# Patient Record
Sex: MALE | Race: WHITE | Employment: OTHER | URBAN - METROPOLITAN AREA
[De-identification: names, ages, dates, MRNs, and addresses within clinical notes are randomized per-mention and may not be internally consistent; named-entity substitution may affect disease eponyms.]

---

## 2017-01-05 ENCOUNTER — TELEPHONE (OUTPATIENT)
Dept: HEMATOLOGY/ONCOLOGY | Facility: HOSPITAL | Age: 72
End: 2017-01-05

## 2017-01-05 ENCOUNTER — OFFICE VISIT (OUTPATIENT)
Dept: SURGERY | Facility: CLINIC | Age: 72
End: 2017-01-05

## 2017-01-05 VITALS
BODY MASS INDEX: 28.63 KG/M2 | HEART RATE: 65 BPM | RESPIRATION RATE: 16 BRPM | SYSTOLIC BLOOD PRESSURE: 149 MMHG | DIASTOLIC BLOOD PRESSURE: 80 MMHG | WEIGHT: 200 LBS | TEMPERATURE: 98 F | HEIGHT: 70 IN

## 2017-01-05 DIAGNOSIS — D12.8 TUBULOVILLOUS ADENOMA OF RECTUM: ICD-10-CM

## 2017-01-05 DIAGNOSIS — K81.2 ACUTE ON CHRONIC CHOLECYSTITIS: Primary | ICD-10-CM

## 2017-01-05 DIAGNOSIS — M10.379 ACUTE GOUT DUE TO RENAL IMPAIRMENT INVOLVING ANKLE, UNSPECIFIED LATERALITY: Primary | ICD-10-CM

## 2017-01-05 DIAGNOSIS — C18.7 MALIGNANT NEOPLASM OF SIGMOID COLON (HCC): ICD-10-CM

## 2017-01-05 PROCEDURE — 99214 OFFICE O/P EST MOD 30 MIN: CPT | Performed by: COLON & RECTAL SURGERY

## 2017-01-05 RX ORDER — ALLOPURINOL 100 MG/1
100 TABLET ORAL DAILY
Qty: 30 TABLET | Refills: 11 | Status: SHIPPED | OUTPATIENT
Start: 2017-01-05 | End: 2017-12-30

## 2017-01-05 NOTE — PROGRESS NOTES
Follow Up Visit Note       Active Problems      1. Acute on chronic cholecystitis    2. Malignant neoplasm of sigmoid colon ([address]    3.  Tubulovillous adenoma of rectum          Chief Complaint   Gallbladder    History of Present Illness  This patient prese dose         Past Surgical History    OTHER SURGICAL HISTORY  2004    Comment left kidney removed    APPENDECTOMY      Comment in [address]  early 2015    COLONOSCOPY  04/21/15, 5/2016    Comment Dr. Tessa Oleary       The family h disturbance. Physical Findings   /80 mmHg  Pulse 65  Temp(Src) 97.7 °F (36.5 °C) (Oral)  Resp 16  Ht 70\"  Wt 200 lb  BMI 28.70 kg/m2  Physical Exam   Constitutional: He is oriented to person, place, and time.  He appears well-developed and wel superficial cervical and no deep cervical adenopathy present. Left cervical: No superficial cervical and no deep cervical adenopathy present. Right: No inguinal and no supraclavicular adenopathy present.         Left: No inguinal and no supracl abdomen other than trocar sites from his laparoscopic robotic low anterior resection of the rectosigmoid colon. Bowel sounds have normal activity and normal pitch.     This patient will undergo laparoscopic cholecystectomy with cholangiogram.    I have ask

## 2017-01-06 PROBLEM — K81.2 ACUTE ON CHRONIC CHOLECYSTITIS: Status: ACTIVE | Noted: 2017-01-06

## 2017-01-06 NOTE — PATIENT INSTRUCTIONS
This patient presents for symptoms of abdominal pain consistent with biliary colic. The patient was recently hospitalized in the emergency department with an acute attack of biliary colic.     He was noted on ultrasound from December 28, 2016 2 have chol 2017.    All risks, benefits, complications and alternatives to the proposed operation were fully discussed with the patient. All questions from the patient were answered in detail.  A description of the procedure and it's possible outcomes was fully discus

## 2017-01-12 ENCOUNTER — ANESTHESIA EVENT (OUTPATIENT)
Dept: SURGERY | Facility: HOSPITAL | Age: 72
End: 2017-01-12
Payer: MEDICARE

## 2017-01-13 ENCOUNTER — ANESTHESIA (OUTPATIENT)
Dept: SURGERY | Facility: HOSPITAL | Age: 72
End: 2017-01-13
Payer: MEDICARE

## 2017-01-13 ENCOUNTER — APPOINTMENT (OUTPATIENT)
Dept: GENERAL RADIOLOGY | Facility: HOSPITAL | Age: 72
End: 2017-01-13
Attending: COLON & RECTAL SURGERY
Payer: MEDICARE

## 2017-01-13 ENCOUNTER — SURGERY (OUTPATIENT)
Age: 72
End: 2017-01-13

## 2017-01-13 PROCEDURE — 74300 X-RAY BILE DUCTS/PANCREAS: CPT

## 2017-01-13 NOTE — ANESTHESIA PREPROCEDURE EVALUATION
PRE-OP EVALUATION    Patient Name: Lito Celis    Pre-op Diagnosis: Malignant neoplasm of sigmoid colon ([facility] [C18.7]    Procedure(s):  REMOVAL OF PORT-A-CATH & LAPAROSCOPIC CHOLECYSTECTOMY WITH CHOLANGIOGRAM          Surgeon(s) and Role:     Calli Pepper Use: No     Available pre-op labs reviewed.     Lab Results  Component Value Date   WBC 7.2 12/28/2016   RBC 4.32 12/28/2016   HGB 13.4 12/28/2016   HCT 38.3 12/28/2016   MCV 88.7 12/28/2016   MCH 31.0 12/28/2016   MCHC 35.0 12/28/2016   RDW 13.6 12/28/2016

## 2017-01-16 ENCOUNTER — TELEPHONE (OUTPATIENT)
Dept: SURGERY | Facility: CLINIC | Age: 72
End: 2017-01-16

## 2017-01-19 ENCOUNTER — OFFICE VISIT (OUTPATIENT)
Dept: SURGERY | Facility: CLINIC | Age: 72
End: 2017-01-19

## 2017-01-19 VITALS
TEMPERATURE: 98 F | WEIGHT: 192 LBS | HEART RATE: 69 BPM | RESPIRATION RATE: 16 BRPM | SYSTOLIC BLOOD PRESSURE: 143 MMHG | BODY MASS INDEX: 28 KG/M2 | DIASTOLIC BLOOD PRESSURE: 84 MMHG

## 2017-01-19 DIAGNOSIS — K80.20 CALCULUS OF GALLBLADDER WITHOUT CHOLECYSTITIS WITHOUT OBSTRUCTION: ICD-10-CM

## 2017-01-19 DIAGNOSIS — K81.2 ACUTE ON CHRONIC CHOLECYSTITIS: Primary | ICD-10-CM

## 2017-01-19 PROCEDURE — 99024 POSTOP FOLLOW-UP VISIT: CPT | Performed by: PHYSICIAN ASSISTANT

## 2017-01-23 NOTE — ANESTHESIA POSTPROCEDURE EVALUATION
Shyla 63 Patient Status:  Hospital Outpatient Surgery   Age/Gender 71year old male MRN [id number]   Location [address] [place] [address] Attending No att. providers found   Hosp Day # 0 PCP Miah Polo MD

## 2017-01-31 ENCOUNTER — TELEPHONE (OUTPATIENT)
Dept: HEMATOLOGY/ONCOLOGY | Facility: HOSPITAL | Age: 72
End: 2017-01-31

## 2017-01-31 RX ORDER — METHYLPREDNISOLONE 4 MG/1
TABLET ORAL
Qty: 1 PACKAGE | Refills: 0 | Status: SHIPPED | OUTPATIENT
Start: 2017-01-31 | End: 2017-06-23 | Stop reason: ALTCHOICE

## 2017-03-10 ENCOUNTER — APPOINTMENT (OUTPATIENT)
Dept: HEMATOLOGY/ONCOLOGY | Facility: HOSPITAL | Age: 72
End: 2017-03-10
Attending: INTERNAL MEDICINE
Payer: MEDICARE

## 2017-03-17 ENCOUNTER — NURSE ONLY (OUTPATIENT)
Dept: HEMATOLOGY/ONCOLOGY | Facility: HOSPITAL | Age: 72
End: 2017-03-17
Attending: INTERNAL MEDICINE
Payer: MEDICARE

## 2017-03-17 DIAGNOSIS — C18.7 MALIGNANT NEOPLASM OF SIGMOID COLON (HCC): Primary | ICD-10-CM

## 2017-03-17 LAB — CEA: 0.5 NG/ML (ref 0.5–5)

## 2017-03-17 PROCEDURE — 36415 COLL VENOUS BLD VENIPUNCTURE: CPT

## 2017-03-17 PROCEDURE — 82378 CARCINOEMBRYONIC ANTIGEN: CPT

## 2017-03-27 ENCOUNTER — OFFICE VISIT (OUTPATIENT)
Dept: SURGERY | Facility: CLINIC | Age: 72
End: 2017-03-27

## 2017-03-27 VITALS
HEIGHT: 70 IN | HEART RATE: 71 BPM | TEMPERATURE: 98 F | DIASTOLIC BLOOD PRESSURE: 80 MMHG | RESPIRATION RATE: 16 BRPM | WEIGHT: 192 LBS | SYSTOLIC BLOOD PRESSURE: 158 MMHG | BODY MASS INDEX: 27.49 KG/M2

## 2017-03-27 DIAGNOSIS — C18.7 MALIGNANT NEOPLASM OF SIGMOID COLON (HCC): Primary | ICD-10-CM

## 2017-03-27 DIAGNOSIS — Z90.5 HISTORY OF NEPHRECTOMY: ICD-10-CM

## 2017-03-27 DIAGNOSIS — N18.2 CHRONIC KIDNEY DISEASE (CKD), STAGE 2 (MILD): ICD-10-CM

## 2017-03-27 DIAGNOSIS — D12.8 TUBULOVILLOUS ADENOMA OF RECTUM: ICD-10-CM

## 2017-03-27 PROCEDURE — 99213 OFFICE O/P EST LOW 20 MIN: CPT | Performed by: COLON & RECTAL SURGERY

## 2017-03-28 PROBLEM — K81.2 ACUTE ON CHRONIC CHOLECYSTITIS: Status: RESOLVED | Noted: 2017-01-06 | Resolved: 2017-03-28

## 2017-03-28 NOTE — PROGRESS NOTES
Follow Up Visit Note       Active Problems      1. Malignant neoplasm of sigmoid colon ([address]    2. Tubulovillous adenoma of rectum    3. History of nephrectomy    4.  Chronic kidney disease (CKD), stage 2 (mild)          Chief Complaint   Patient presents w Social / Family History    The past medical and past surgical history have been reviewed by me today.     Past Medical History   Diagnosis Date   • High blood pressure      NO MEDS   • High cholesterol      NO MEDS   • Visual impairment      GLASSES   • Can swelling. Gastrointestinal: Negative for nausea, vomiting, abdominal pain, diarrhea, constipation, blood in stool, abdominal distention and anal bleeding. Genitourinary: Negative for dysuria, urgency, frequency and difficulty urinating.    Musculoskelet Clinical exam reveals the abdomen to be soft, nondistended, nontender, good bowel sounds. He has a well-healed right paramedian appendix incision. He has multiple laparoscopic incisions that are well-healed.   He has bowel sounds with normal active and Astler-Coller C2 lesion. He has completed his chemotherapy. The patient's port is subsequently been removed. The patient has no weight loss. He has no abdominal pain or complaints. He has no diarrhea or constipation.   He has no bright red blood pe

## 2017-03-28 NOTE — PATIENT INSTRUCTIONS
This patient presents for further care treatment regarding a previous rectal villous adenoma, and a malignant neoplasm of the sigmoid colon.     The patient's presenting history as listed below:    On May 6, 2015, he underwent transanal excision of a large months and possibly years. My next visit with this patient will be in 3 months for further care and treatment of his above listed problems and issues. No see this patient again in 3 months for further care and treatment. Jeronimo Mejia

## 2017-06-09 ENCOUNTER — APPOINTMENT (OUTPATIENT)
Dept: HEMATOLOGY/ONCOLOGY | Facility: HOSPITAL | Age: 72
End: 2017-06-09
Attending: INTERNAL MEDICINE
Payer: MEDICARE

## 2017-06-23 ENCOUNTER — OFFICE VISIT (OUTPATIENT)
Dept: HEMATOLOGY/ONCOLOGY | Facility: HOSPITAL | Age: 72
End: 2017-06-23
Attending: INTERNAL MEDICINE
Payer: MEDICARE

## 2017-06-23 VITALS
SYSTOLIC BLOOD PRESSURE: 160 MMHG | BODY MASS INDEX: 27.86 KG/M2 | HEIGHT: 70 IN | HEART RATE: 59 BPM | RESPIRATION RATE: 18 BRPM | DIASTOLIC BLOOD PRESSURE: 81 MMHG | TEMPERATURE: 99 F | OXYGEN SATURATION: 98 % | WEIGHT: 194.63 LBS

## 2017-06-23 DIAGNOSIS — C18.7 MALIGNANT NEOPLASM OF SIGMOID COLON (HCC): ICD-10-CM

## 2017-06-23 DIAGNOSIS — C18.9 MALIGNANT NEOPLASM OF COLON, UNSPECIFIED PART OF COLON (HCC): Primary | ICD-10-CM

## 2017-06-23 PROCEDURE — 99213 OFFICE O/P EST LOW 20 MIN: CPT | Performed by: INTERNAL MEDICINE

## 2017-06-23 NOTE — PROGRESS NOTES
Pt is here for MD f/u for colon cancer. Pt is here with daughter. Denies any complaints. Occasional generalized aches and pains. Appetite and energy level is good. Pt had a cholecystectomy in January.          Education Record    Learner:  Patient and Famil

## 2017-06-26 ENCOUNTER — OFFICE VISIT (OUTPATIENT)
Dept: SURGERY | Facility: CLINIC | Age: 72
End: 2017-06-26

## 2017-06-26 VITALS
HEART RATE: 62 BPM | TEMPERATURE: 98 F | SYSTOLIC BLOOD PRESSURE: 160 MMHG | HEIGHT: 70 IN | RESPIRATION RATE: 18 BRPM | WEIGHT: 198.19 LBS | BODY MASS INDEX: 28.37 KG/M2 | DIASTOLIC BLOOD PRESSURE: 77 MMHG

## 2017-06-26 DIAGNOSIS — N18.2 STAGE 2 CHRONIC KIDNEY DISEASE: ICD-10-CM

## 2017-06-26 DIAGNOSIS — T45.1X5A NEUROPATHY DUE TO CHEMOTHERAPEUTIC DRUG (HCC): ICD-10-CM

## 2017-06-26 DIAGNOSIS — G62.0 NEUROPATHY DUE TO CHEMOTHERAPEUTIC DRUG (HCC): ICD-10-CM

## 2017-06-26 DIAGNOSIS — Z90.5 HISTORY OF NEPHRECTOMY: ICD-10-CM

## 2017-06-26 DIAGNOSIS — C18.7 MALIGNANT NEOPLASM OF SIGMOID COLON (HCC): Primary | ICD-10-CM

## 2017-06-26 PROCEDURE — 99213 OFFICE O/P EST LOW 20 MIN: CPT | Performed by: COLON & RECTAL SURGERY

## 2017-06-26 NOTE — PROGRESS NOTES
[facility]    PATIENT'S NAME: Bruno Prince   ATTENDING PHYSICIAN: Margo Patiño M.D.    PATIENT ACCOUNT #: [de-identified] LOCATION: [address] [place] [address] RECORD #: [id number] YOB: 1945   DATE OF SERVICE: 06/23/2017       CANCER CE pounds. Blood pressure is 160/81, pulse 59, respiratory rate is 20, temperature is 99.1. HEENT:  Unremarkable. He has pink conjunctivae, anicteric sclerae. Pharynx without lesions.     LYMPHATICS:  He has no cervical, supraclavicular, or axillary adenop

## 2017-06-27 NOTE — PROGRESS NOTES
Follow Up Visit Note       Active Problems      1. Malignant neoplasm of sigmoid colon ([address]    2. History of nephrectomy    3. Stage 2 chronic kidney disease    4.  Neuropathy due to chemotherapeutic drug [facility])          Chief Complaint   Patient presents w / Family History    The past medical and past surgical history have been reviewed by me today.     Past Medical History:   Diagnosis Date   • Cancer ([facility] [address]     colon   • High blood pressure     NO MEDS   • High cholesterol     NO MEDS   • Personal history of swelling. Gastrointestinal: Negative for abdominal distention, abdominal pain, anal bleeding, blood in stool, constipation, diarrhea, nausea, rectal pain and vomiting. Genitourinary: Negative for difficulty urinating, dysuria, frequency and urgency. inguinal area and confirmed negative in the left inguinal area. Clinical exam reveals his abdomen to be soft, nondistended, nontender, good bowel sounds. He has essentially invisible incisions from his abdominal operation.   There are no inguinal, umbili resection the rectosigmoid colon for tumor at the rectosigmoid junction at the level of the sacral promontory. It was a moderately differentiated adenocarcinoma. He had T3 N1 M0 stage IIIB cancer. This is a Dukes Astler-Coller C2 lesion.      He has comp

## 2017-06-28 NOTE — PATIENT INSTRUCTIONS
This patient presents for further care treatment regarding a previous rectal villous adenoma, and a malignant neoplasm of the sigmoid colon.      The patient's presenting history as listed below:     On May 6, 2015, he underwent transanal excision of a larg residual disease from all of his above listed issues. I will see him again in September 2017.

## 2017-07-07 ENCOUNTER — TELEPHONE (OUTPATIENT)
Dept: SURGERY | Facility: CLINIC | Age: 72
End: 2017-07-07

## 2017-07-07 NOTE — TELEPHONE ENCOUNTER
Left message for daughter Sandra regarding her father Bridgett Fierro. Per Dr. Wyatt Upton patient needs to be seen for a 3 month follow up in 9/2017. Advised to call the office to make appointment.

## 2017-09-28 ENCOUNTER — APPOINTMENT (OUTPATIENT)
Dept: HEMATOLOGY/ONCOLOGY | Facility: HOSPITAL | Age: 72
End: 2017-09-28
Attending: INTERNAL MEDICINE
Payer: MEDICARE

## 2017-09-28 ENCOUNTER — OFFICE VISIT (OUTPATIENT)
Dept: SURGERY | Facility: CLINIC | Age: 72
End: 2017-09-28

## 2017-09-28 VITALS
SYSTOLIC BLOOD PRESSURE: 163 MMHG | RESPIRATION RATE: 12 BRPM | WEIGHT: 201 LBS | TEMPERATURE: 98 F | HEART RATE: 69 BPM | DIASTOLIC BLOOD PRESSURE: 88 MMHG | BODY MASS INDEX: 29 KG/M2

## 2017-09-28 DIAGNOSIS — Z90.5 HISTORY OF NEPHRECTOMY: ICD-10-CM

## 2017-09-28 DIAGNOSIS — C18.7 MALIGNANT NEOPLASM OF SIGMOID COLON (HCC): Primary | ICD-10-CM

## 2017-09-28 DIAGNOSIS — D12.8 TUBULOVILLOUS ADENOMA OF RECTUM: ICD-10-CM

## 2017-09-28 PROCEDURE — 99213 OFFICE O/P EST LOW 20 MIN: CPT | Performed by: COLON & RECTAL SURGERY

## 2017-09-28 NOTE — PROGRESS NOTES
Follow Up Visit Note       Active Problems      1. Malignant neoplasm of sigmoid colon ([address]    2. History of nephrectomy    3. Tubulovillous adenoma of rectum          Chief Complaint   Patient presents with:  Anal / Rectal Problem: 3 month colon f/u.  Pt medical and past surgical history have been reviewed by me today.     Past Medical History:   Diagnosis Date   • Cancer ([facility] [address]     colon   • High blood pressure     NO MEDS   • High cholesterol     NO MEDS   • Personal history of antineoplastic chemotherapy chest pain, palpitations and leg swelling. Gastrointestinal: Negative for abdominal distention, abdominal pain, anal bleeding, blood in stool, constipation, diarrhea, nausea and vomiting.    Genitourinary: Negative for difficulty urinating, dysuria, frequ left inguinal area. Clinical exam reveals his abdomen to be soft, nontender, nondistended, good bowel sounds. He has multiple well-healed laparoscopic incisions. He has a previous right lower quadrant appendix incision.   He has a left flank incisio robotic low anterior resection the rectosigmoid colon for tumor at the rectosigmoid junction at the level of the sacral promontory. It was a moderately differentiated adenocarcinoma. He had T3 N1 M0 stage IIIB cancer.   This is a Dukes Astler-Coller C2 le

## 2017-09-29 NOTE — PATIENT INSTRUCTIONS
This patient presents for further care treatment regarding a previous rectal villous adenoma, and a malignant neoplasm of the sigmoid colon.      The patient's presenting history as listed below:     On May 6, 2015, he underwent transanal excision of a larg clinical examination. I will see the patient again in 3 months for further care and treatment.

## 2017-11-08 ENCOUNTER — TELEPHONE (OUTPATIENT)
Dept: HEMATOLOGY/ONCOLOGY | Facility: HOSPITAL | Age: 72
End: 2017-11-08

## 2017-11-08 DIAGNOSIS — C18.7 MALIGNANT NEOPLASM OF SIGMOID COLON (HCC): Primary | ICD-10-CM

## 2017-12-08 ENCOUNTER — APPOINTMENT (OUTPATIENT)
Dept: HEMATOLOGY/ONCOLOGY | Facility: HOSPITAL | Age: 72
End: 2017-12-08
Attending: INTERNAL MEDICINE
Payer: MEDICARE

## 2017-12-18 ENCOUNTER — HOSPITAL ENCOUNTER (OUTPATIENT)
Dept: CT IMAGING | Facility: HOSPITAL | Age: 72
Discharge: HOME | End: 2017-12-18
Attending: INTERNAL MEDICINE
Payer: MEDICARE

## 2017-12-18 DIAGNOSIS — C18.7 MALIGNANT NEOPLASM OF SIGMOID COLON (HCC): ICD-10-CM

## 2017-12-18 PROCEDURE — 71250 CT THORAX DX C-: CPT | Performed by: INTERNAL MEDICINE

## 2017-12-18 PROCEDURE — 74176 CT ABD & PELVIS W/O CONTRAST: CPT | Performed by: INTERNAL MEDICINE

## 2017-12-26 ENCOUNTER — APPOINTMENT (OUTPATIENT)
Dept: HEMATOLOGY/ONCOLOGY | Facility: HOSPITAL | Age: 72
End: 2017-12-26
Attending: INTERNAL MEDICINE
Payer: MEDICARE

## 2017-12-29 ENCOUNTER — OFFICE VISIT (OUTPATIENT)
Dept: HEMATOLOGY/ONCOLOGY | Facility: HOSPITAL | Age: 72
End: 2017-12-29
Attending: INTERNAL MEDICINE
Payer: MEDICARE

## 2017-12-29 VITALS
TEMPERATURE: 99 F | OXYGEN SATURATION: 93 % | RESPIRATION RATE: 18 BRPM | DIASTOLIC BLOOD PRESSURE: 81 MMHG | WEIGHT: 202.38 LBS | HEART RATE: 76 BPM | SYSTOLIC BLOOD PRESSURE: 185 MMHG | HEIGHT: 70 IN | BODY MASS INDEX: 28.97 KG/M2

## 2017-12-29 DIAGNOSIS — C18.9 MALIGNANT NEOPLASM OF COLON, UNSPECIFIED PART OF COLON (HCC): ICD-10-CM

## 2017-12-29 LAB — CEA: 0.5 NG/ML (ref 0.5–5)

## 2017-12-29 PROCEDURE — 99213 OFFICE O/P EST LOW 20 MIN: CPT | Performed by: INTERNAL MEDICINE

## 2017-12-29 NOTE — PROGRESS NOTES
Patient is here for MD f/u for colon cancer. Pt had a ct scan on 12/19. Feeling well. Appetite and energy level is good. No complaints. Here with family.         Education Record    Learner:  Patient and Family Member    Disease / Diagnosis:  Colon cancer

## 2017-12-30 DIAGNOSIS — M10.379 ACUTE GOUT DUE TO RENAL IMPAIRMENT INVOLVING ANKLE, UNSPECIFIED LATERALITY: ICD-10-CM

## 2017-12-30 DIAGNOSIS — C18.7 MALIGNANT NEOPLASM OF SIGMOID COLON (HCC): ICD-10-CM

## 2017-12-31 RX ORDER — ALLOPURINOL 100 MG/1
TABLET ORAL
Qty: 30 TABLET | Refills: 0 | Status: SHIPPED | OUTPATIENT
Start: 2017-12-31 | End: 2018-02-03

## 2018-01-01 NOTE — PROGRESS NOTES
[facility]    PATIENT'S NAME: Gia Faustin   ATTENDING PHYSICIAN: Ronny Varela M.D.    PATIENT ACCOUNT #: [de-identified] LOCATION: [address] [place] [address] RECORD #: [id number] YOB: 1945   DATE OF SERVICE: 12/29/2017       CANCER CE or gallop. ABDOMEN:  No hepatosplenomegaly or tenderness. EXTREMITIES:  He has no clubbing, cyanosis, or edema. NEUROLOGIC:  He is intact. LABORATORY DATA:  CEA was drawn today, it is 0.5.     IMPRESSION:  The patient is well with no evidence of re

## 2018-01-11 ENCOUNTER — OFFICE VISIT (OUTPATIENT)
Dept: SURGERY | Facility: CLINIC | Age: 73
End: 2018-01-11

## 2018-01-11 VITALS
DIASTOLIC BLOOD PRESSURE: 95 MMHG | TEMPERATURE: 98 F | BODY MASS INDEX: 29.33 KG/M2 | HEIGHT: 69 IN | HEART RATE: 77 BPM | SYSTOLIC BLOOD PRESSURE: 168 MMHG | WEIGHT: 198 LBS

## 2018-01-11 DIAGNOSIS — Z90.5 HISTORY OF NEPHRECTOMY: ICD-10-CM

## 2018-01-11 DIAGNOSIS — C18.7 MALIGNANT NEOPLASM OF SIGMOID COLON (HCC): Primary | ICD-10-CM

## 2018-01-11 DIAGNOSIS — D12.8 TUBULOVILLOUS ADENOMA OF RECTUM: ICD-10-CM

## 2018-01-11 PROCEDURE — 99213 OFFICE O/P EST LOW 20 MIN: CPT | Performed by: COLON & RECTAL SURGERY

## 2018-01-11 NOTE — PROGRESS NOTES
Follow Up Visit Note       Active Problems      1. Malignant neoplasm of sigmoid colon ([address]    2. History of nephrectomy    3.  Tubulovillous adenoma of rectum          Chief Complaint   Rectosigmoid cancer, villous tumor of the rectum      History of Pres with this patient was 30 minutes. Greater than half of our visit was spent in counseling the patient on the above listed diagnoses and treatment options.         PROCEDURE:  CT CHEST+ABDOMEN+PELVIS(CPT=71250/26633)     COMPARISON:  [facility] , CT CHEST+ABDOME aneurysm. RETROPERITONEUM:  No adenopathy. BOWEL/MESENTERY:  Normal bowel caliber. No new colonic inflammation. No ascites. Distal sigmoid stable line. ABDOMINAL WALL:  Tiny fat containing inguinal hernias.   URINARY BLADDER:  Normal moderately diste Packs/day: 0.50      Years: 35.00          Quit date: 12/28/1995    Smokeless tobacco: Never Used                        Alcohol use: Yes                Comment: 1-2 a weekj    Drug use:  No                 Current Ou Normal rate, regular rhythm, S1 normal, S2 normal and normal heart sounds. No murmur heard. Pulmonary/Chest: Effort normal and breath sounds normal. No accessory muscle usage. No tachypnea. No respiratory distress. He has no decreased breath sounds.  He diaphoretic. Nursing note and vitals reviewed.            Assessment   Malignant neoplasm of sigmoid colon ([facility] [address]  (primary encounter diagnosis)  History of nephrectomy  Tubulovillous adenoma of rectum    Plan     I am seeing this patient for further care bowel sounds. He has a right lower quadrant appendix incision. He has no visible incisions from his laparoscopic low anterior resection of the rectosigmoid colon. There is no evidence of ascites. Liver is normal, spleen is not palpable.   He has no curr

## 2018-01-11 NOTE — PATIENT INSTRUCTIONS
I am seeing this patient for further care and treatment of his sigmoid colon cancer. This patient had a previous rectal villous adenoma, and a malignant neoplasm of the sigmoid colon.       On May 6, 2015, he underwent transanal excision of a large anal spleen is not palpable. He has no current hernias. He has no abdominal wall masses, or masses within the abdominal cavity. Bowel sounds have normal activity normal pitch. There is no lymphadenopathy in the inguinal regions.     This patient demonstrates

## 2018-02-03 DIAGNOSIS — M10.379 ACUTE GOUT DUE TO RENAL IMPAIRMENT INVOLVING ANKLE, UNSPECIFIED LATERALITY: ICD-10-CM

## 2018-02-03 DIAGNOSIS — C18.7 MALIGNANT NEOPLASM OF SIGMOID COLON (HCC): ICD-10-CM

## 2018-02-03 RX ORDER — ALLOPURINOL 100 MG/1
TABLET ORAL
Qty: 30 TABLET | Refills: 0 | Status: SHIPPED | OUTPATIENT
Start: 2018-02-03 | End: 2018-02-21

## 2018-02-21 DIAGNOSIS — C18.7 MALIGNANT NEOPLASM OF SIGMOID COLON (HCC): ICD-10-CM

## 2018-02-21 DIAGNOSIS — M10.379 ACUTE GOUT DUE TO RENAL IMPAIRMENT INVOLVING ANKLE, UNSPECIFIED LATERALITY: ICD-10-CM

## 2018-02-21 RX ORDER — ALLOPURINOL 100 MG/1
TABLET ORAL
Qty: 30 TABLET | Refills: 0 | Status: SHIPPED | OUTPATIENT
Start: 2018-02-21 | End: 2018-03-28

## 2018-03-22 ENCOUNTER — NURSE ONLY (OUTPATIENT)
Dept: HEMATOLOGY/ONCOLOGY | Facility: HOSPITAL | Age: 73
End: 2018-03-22
Attending: INTERNAL MEDICINE
Payer: MEDICARE

## 2018-03-22 DIAGNOSIS — C18.9 MALIGNANT NEOPLASM OF COLON, UNSPECIFIED PART OF COLON (HCC): ICD-10-CM

## 2018-03-22 LAB — CEA: 0.6 NG/ML (ref 0.5–5)

## 2018-03-22 PROCEDURE — 36415 COLL VENOUS BLD VENIPUNCTURE: CPT

## 2018-03-22 PROCEDURE — 82378 CARCINOEMBRYONIC ANTIGEN: CPT

## 2018-03-26 ENCOUNTER — APPOINTMENT (OUTPATIENT)
Dept: HEMATOLOGY/ONCOLOGY | Facility: HOSPITAL | Age: 73
End: 2018-03-26
Attending: INTERNAL MEDICINE
Payer: MEDICARE

## 2018-03-28 DIAGNOSIS — M10.379 ACUTE GOUT DUE TO RENAL IMPAIRMENT INVOLVING ANKLE, UNSPECIFIED LATERALITY: ICD-10-CM

## 2018-03-28 DIAGNOSIS — C18.7 MALIGNANT NEOPLASM OF SIGMOID COLON (HCC): ICD-10-CM

## 2018-03-28 RX ORDER — ALLOPURINOL 100 MG/1
TABLET ORAL
Qty: 30 TABLET | Refills: 0 | Status: SHIPPED | OUTPATIENT
Start: 2018-03-28 | End: 2018-07-24

## 2018-04-02 ENCOUNTER — APPOINTMENT (OUTPATIENT)
Dept: HEMATOLOGY/ONCOLOGY | Facility: HOSPITAL | Age: 73
End: 2018-04-02
Attending: INTERNAL MEDICINE
Payer: MEDICARE

## 2018-06-11 ENCOUNTER — TELEPHONE (OUTPATIENT)
Dept: HEMATOLOGY/ONCOLOGY | Facility: HOSPITAL | Age: 73
End: 2018-06-11

## 2018-06-11 RX ORDER — ALLOPURINOL 100 MG/1
100 TABLET ORAL DAILY
Qty: 30 TABLET | Refills: 11 | Status: SHIPPED | OUTPATIENT
Start: 2018-06-11 | End: 2019-07-11

## 2018-07-10 ENCOUNTER — APPOINTMENT (OUTPATIENT)
Dept: HEMATOLOGY/ONCOLOGY | Facility: HOSPITAL | Age: 73
End: 2018-07-10
Attending: INTERNAL MEDICINE
Payer: MEDICARE

## 2018-07-12 ENCOUNTER — OFFICE VISIT (OUTPATIENT)
Dept: SURGERY | Facility: CLINIC | Age: 73
End: 2018-07-12

## 2018-07-12 VITALS
WEIGHT: 198 LBS | HEIGHT: 69 IN | HEART RATE: 68 BPM | DIASTOLIC BLOOD PRESSURE: 83 MMHG | BODY MASS INDEX: 29.33 KG/M2 | SYSTOLIC BLOOD PRESSURE: 165 MMHG

## 2018-07-12 DIAGNOSIS — Z90.5 HISTORY OF NEPHRECTOMY: ICD-10-CM

## 2018-07-12 DIAGNOSIS — C18.7 MALIGNANT NEOPLASM OF SIGMOID COLON (HCC): Primary | ICD-10-CM

## 2018-07-12 DIAGNOSIS — D12.8 TUBULOVILLOUS ADENOMA OF RECTUM: ICD-10-CM

## 2018-07-12 PROCEDURE — 99213 OFFICE O/P EST LOW 20 MIN: CPT | Performed by: COLON & RECTAL SURGERY

## 2018-07-12 NOTE — PROGRESS NOTES
Follow Up Visit Note       Active Problems      1. Malignant neoplasm of sigmoid colon ([address]    2. Tubulovillous adenoma of rectum    3.  History of nephrectomy          Chief Complaint   Patient presents with:  Colon Problem: 6 month follow up for further 10/27/2016, 8:32. INDICATIONS:  C18.7 Malignant neoplasm of sigmoid colon     TECHNIQUE:  Following oral contrast administration, unenhanced multislice CT scanning is performed through the chest, abdomen, and pelvis.   Dose reduction techniques were use enlarged. PELVIC ORGANS:  Moderate prostatomegaly. BONES:  Moderate to severe disk degenerative changes L4-L5 appearing similar to the prior. Mild to moderate degenerative changes elsewhere.     =====  CONCLUSION:    1.   No findings of recurrent neoplas Prescriptions:  allopurinol 100 MG Oral Tab Take 1 tablet (100 mg total) by mouth daily. Disp: 30 tablet Rfl: 11   aspirin 81 MG Oral Tab Take 81 mg by mouth daily.  Disp:  Rfl:         Review of Systems  The Review of Systems has been reviewed by me during has no rales. He exhibits no mass. Abdominal: Soft. Normal appearance, normal aorta and bowel sounds are normal. He exhibits no distension, no fluid wave, no ascites, no pulsatile midline mass and no mass. There is no splenomegaly or hepatomegaly.  There history as listed below:     On May 6, 2015, he underwent transanal excision of a large anal canal and rectal polyp. This was noted to have focal high-grade dysplasia. There was no invasive tumor at the complete removal of this lesion.   There are good ma

## 2018-07-24 ENCOUNTER — OFFICE VISIT (OUTPATIENT)
Dept: HEMATOLOGY/ONCOLOGY | Facility: HOSPITAL | Age: 73
End: 2018-07-24
Attending: INTERNAL MEDICINE
Payer: MEDICARE

## 2018-07-24 VITALS
BODY MASS INDEX: 27.75 KG/M2 | HEIGHT: 70 IN | HEART RATE: 56 BPM | SYSTOLIC BLOOD PRESSURE: 174 MMHG | TEMPERATURE: 99 F | RESPIRATION RATE: 18 BRPM | DIASTOLIC BLOOD PRESSURE: 76 MMHG | OXYGEN SATURATION: 97 % | WEIGHT: 193.81 LBS

## 2018-07-24 DIAGNOSIS — C18.9 MALIGNANT NEOPLASM OF COLON, UNSPECIFIED PART OF COLON (HCC): ICD-10-CM

## 2018-07-24 LAB — CEA SERPL-MCNC: 0.5 NG/ML (ref 0.5–5)

## 2018-07-24 PROCEDURE — 99213 OFFICE O/P EST LOW 20 MIN: CPT | Performed by: INTERNAL MEDICINE

## 2018-07-24 NOTE — PROGRESS NOTES
Patient is here for MD f/u for colon cancer. Last ct scan was in December. Pt denies any GI complaints. Appetite and energy level is good.          Education Record    Learner:  Patient and Family Member    Disease / Diagnosis:  Colon cancer     Barriers /

## 2018-07-26 NOTE — PATIENT INSTRUCTIONS
This patient presents for further care treatment regarding a previous rectal villous adenoma, and a malignant neoplasm of the sigmoid colon.      The patient's presenting history as listed below:     On May 6, 2015, he underwent transanal excision of a larg

## 2018-07-31 NOTE — PROGRESS NOTES
[facility]    PATIENT'S NAME: Grace Coyne   ATTENDING PHYSICIAN: Sarah Taylor M.D.    PATIENT ACCOUNT #: [de-identified] LOCATION: [address] [place] [address] RECORD #: [id number] YOB: 1945   DATE OF SERVICE: 07/24/2018       CANCER CE or edema. NEUROLOGIC:  He is intact. LABORATORY DATA:  CEA was drawn and it is 0.5. IMPRESSION:  Colon cancer:  He is over 3 years from surgery and 3 years disease free.   We will do another CT scan in December and we will probably stop doing them af

## 2018-12-17 ENCOUNTER — HOSPITAL ENCOUNTER (OUTPATIENT)
Dept: CT IMAGING | Facility: HOSPITAL | Age: 73
Discharge: HOME | End: 2018-12-17
Attending: INTERNAL MEDICINE
Payer: MEDICARE

## 2018-12-17 ENCOUNTER — TELEPHONE (OUTPATIENT)
Dept: HEMATOLOGY/ONCOLOGY | Facility: HOSPITAL | Age: 73
End: 2018-12-17

## 2018-12-17 DIAGNOSIS — C18.9 MALIGNANT NEOPLASM OF COLON, UNSPECIFIED PART OF COLON (HCC): ICD-10-CM

## 2018-12-17 PROCEDURE — 74176 CT ABD & PELVIS W/O CONTRAST: CPT | Performed by: INTERNAL MEDICINE

## 2018-12-17 PROCEDURE — 82565 ASSAY OF CREATININE: CPT

## 2018-12-17 PROCEDURE — 71250 CT THORAX DX C-: CPT | Performed by: INTERNAL MEDICINE

## 2018-12-17 NOTE — TELEPHONE ENCOUNTER
Pt at CT scan now. Stat creat 2.5, pt already drank PO contrast for CAP. Discussed with ALEXANDRA Culver. NO IV contrast. CT tech aware.

## 2019-01-10 ENCOUNTER — OFFICE VISIT (OUTPATIENT)
Dept: SURGERY | Facility: CLINIC | Age: 74
End: 2019-01-10
Payer: MEDICARE

## 2019-01-10 VITALS
TEMPERATURE: 98 F | HEART RATE: 78 BPM | DIASTOLIC BLOOD PRESSURE: 77 MMHG | WEIGHT: 193 LBS | BODY MASS INDEX: 28 KG/M2 | SYSTOLIC BLOOD PRESSURE: 166 MMHG

## 2019-01-10 DIAGNOSIS — C18.7 MALIGNANT NEOPLASM OF SIGMOID COLON (HCC): Primary | ICD-10-CM

## 2019-01-10 DIAGNOSIS — D12.8 TUBULOVILLOUS ADENOMA OF RECTUM: ICD-10-CM

## 2019-01-10 PROCEDURE — 99213 OFFICE O/P EST LOW 20 MIN: CPT | Performed by: COLON & RECTAL SURGERY

## 2019-01-10 NOTE — PROGRESS NOTES
Follow Up Visit Note       Active Problems      1. Malignant neoplasm of sigmoid colon ([address]    2.  Tubulovillous adenoma of rectum          Chief Complaint   Patient presents with:  Colon Cancer: cont care colon cancer, pt denies concerns, seeing Dr. Macy Brown was 30 minutes. Greater than half of our visit was spent in counseling the patient on the above listed diagnoses and treatment options.         PROCEDURE:  CT CHEST+ABDOMEN+PELVIS(CPT=71250/48364)     COMPARISON:  [facility] , CT CHEST+ABDOMEN+PELVIS(JVI=33685 bowel wall thickening. ABDOMINAL WALL:  Stable tiny bilateral fat containing inguinal hernias. URINARY BLADDER:  Incompletely distended. No visible focal wall thickening, lesion, or calculus. PELVIC NODES:  No adenopathy.     PELVIC ORGANS:  Stable en       Spouse name: Not on file      Number of children: Not on file      Years of education: Not on file      Highest education level: Not on file    Tobacco Use      Smoking status: Former Smoker        Packs/day: 0.50        Years: 35.00        P atraumatic. Eyes: Conjunctivae are normal. No scleral icterus. Neck: Trachea normal. No JVD present. Carotid bruit is not present. No tracheal deviation present. No thyroid mass and no thyromegaly present.    Cardiovascular: Normal rate, regular rhythm, reviewed.            Assessment   Malignant neoplasm of sigmoid colon ([facility] [address]  (primary encounter diagnosis)  Tubulovillous adenoma of rectum    Plan   This patient presents for further care treatment regarding a previous rectal villous adenoma, and a maligna and spleen are not palpable. There are no inguinal, umbilical, or incisional hernias present. All incisions have faded. Bowel sounds have normal activity and normal pitch.     This patient remains without any evidence of recurrent or residual disease fro

## 2019-01-11 ENCOUNTER — OFFICE VISIT (OUTPATIENT)
Dept: HEMATOLOGY/ONCOLOGY | Facility: HOSPITAL | Age: 74
End: 2019-01-11
Attending: INTERNAL MEDICINE
Payer: MEDICARE

## 2019-01-11 VITALS
SYSTOLIC BLOOD PRESSURE: 167 MMHG | OXYGEN SATURATION: 97 % | HEIGHT: 70 IN | WEIGHT: 198.81 LBS | BODY MASS INDEX: 28.46 KG/M2 | DIASTOLIC BLOOD PRESSURE: 81 MMHG | HEART RATE: 63 BPM | TEMPERATURE: 98 F

## 2019-01-11 DIAGNOSIS — C18.9 MALIGNANT NEOPLASM OF COLON, UNSPECIFIED PART OF COLON (HCC): ICD-10-CM

## 2019-01-11 LAB — CEA SERPL-MCNC: 0.5 NG/ML (ref 0.5–5)

## 2019-01-11 PROCEDURE — 99213 OFFICE O/P EST LOW 20 MIN: CPT | Performed by: INTERNAL MEDICINE

## 2019-01-11 NOTE — PROGRESS NOTES
Patient is here for MD f/u for colon cancer . Last Ct scan was on 12/17. Feeling well. No complaints.         Education Record    Learner:  Patient    Disease / Diagnosis:  Colon cancer     Barriers / Limitations:  None   Comments:    Method:  Discussion

## 2019-01-11 NOTE — PATIENT INSTRUCTIONS
This patient presents for further care treatment regarding a previous rectal villous adenoma, and a malignant neoplasm of the sigmoid colon.      The patient's presenting history as listed below:     On May 6, 2015, he underwent transanal excision of a larg activity and normal pitch. This patient remains without any evidence of recurrent or residual disease from his Collier Astler-Coller C2 lesion resected on May 27, 2015. I will see this patient again in 6 months for further care and treatment.

## 2019-01-14 NOTE — PROGRESS NOTES
[facility]    PATIENT'S NAME: Silas Thompson   ATTENDING PHYSICIAN: Lorraine Caceres M.D.    PATIENT ACCOUNT #: [de-identified] LOCATION: [address] [place] [address] RECORD #: [id number] YOB: 1945   DATE OF SERVICE: 01/11/2019       CANCER CE clear to auscultation, with no wheezing, rales, or rhonchi. HEART:  Normal.  ABDOMEN:  No hepatosplenomegaly or tenderness. EXTREMITIES:  He has no clubbing, cyanosis, or edema. NEUROLOGIC:  Intact. LABORATORY DATA:  His CEA was 0.5.     IMPRESSION:

## 2019-06-14 PROBLEM — D12.2 BENIGN NEOPLASM OF ASCENDING COLON: Status: ACTIVE | Noted: 2019-06-14

## 2019-06-14 PROBLEM — Z85.038 PERSONAL HISTORY OF OTHER MALIGNANT NEOPLASM OF LARGE INTESTINE: Status: ACTIVE | Noted: 2019-06-14

## 2019-07-05 ENCOUNTER — OFFICE VISIT (OUTPATIENT)
Dept: HEMATOLOGY/ONCOLOGY | Facility: HOSPITAL | Age: 74
End: 2019-07-05
Attending: INTERNAL MEDICINE
Payer: MEDICARE

## 2019-07-05 VITALS
OXYGEN SATURATION: 97 % | TEMPERATURE: 98 F | HEART RATE: 60 BPM | DIASTOLIC BLOOD PRESSURE: 78 MMHG | BODY MASS INDEX: 30.1 KG/M2 | SYSTOLIC BLOOD PRESSURE: 190 MMHG | RESPIRATION RATE: 20 BRPM | HEIGHT: 67.99 IN | WEIGHT: 198.63 LBS

## 2019-07-05 DIAGNOSIS — C18.9 MALIGNANT NEOPLASM OF COLON, UNSPECIFIED PART OF COLON (HCC): ICD-10-CM

## 2019-07-05 LAB — CEA SERPL-MCNC: 0.5 NG/ML (ref ?–5)

## 2019-07-05 PROCEDURE — 99213 OFFICE O/P EST LOW 20 MIN: CPT | Performed by: INTERNAL MEDICINE

## 2019-07-05 NOTE — PROGRESS NOTES
MD f/u for colon ca. Reports that he is healing well. Denies any issues or complaints. Colonoscopy completed 2 weeks ago.      Education Record    Learner:  Patient, family     Barriers / Limitations:  None   Comments:    Method:  Discussion   Comments:

## 2019-07-07 NOTE — PROGRESS NOTES
[facility]    PATIENT'S NAME: Johnsay Og   ATTENDING PHYSICIAN: Barrera Ramsey M.D.    PATIENT ACCOUNT #: [de-identified] LOCATION: [address] [place] [address] RECORD #: [id number] YOB: 1945   DATE OF SERVICE: 07/05/2019       CANCER CE Regular S1 and S2 with no murmur or gallop. ABDOMEN:  No hepatosplenomegaly or tenderness. EXTREMITIES:  He has no clubbing, cyanosis, or edema. NEUROLOGIC:  He is intact. LABORATORY DATA:  CEA was drawn today, it is 0.5.     IMPRESSION:  History o

## 2019-07-11 ENCOUNTER — OFFICE VISIT (OUTPATIENT)
Dept: SURGERY | Facility: CLINIC | Age: 74
End: 2019-07-11

## 2019-07-11 VITALS
SYSTOLIC BLOOD PRESSURE: 179 MMHG | HEART RATE: 60 BPM | DIASTOLIC BLOOD PRESSURE: 84 MMHG | TEMPERATURE: 98 F | BODY MASS INDEX: 31.08 KG/M2 | HEIGHT: 67 IN | WEIGHT: 198 LBS

## 2019-07-11 DIAGNOSIS — D12.8 TUBULOVILLOUS ADENOMA OF RECTUM: ICD-10-CM

## 2019-07-11 DIAGNOSIS — C18.7 MALIGNANT NEOPLASM OF SIGMOID COLON (HCC): Primary | ICD-10-CM

## 2019-07-11 PROCEDURE — 99214 OFFICE O/P EST MOD 30 MIN: CPT | Performed by: COLON & RECTAL SURGERY

## 2019-07-11 RX ORDER — ALLOPURINOL 100 MG/1
TABLET ORAL
Qty: 30 TABLET | Refills: 11 | Status: SHIPPED | OUTPATIENT
Start: 2019-07-11 | End: 2020-07-23

## 2019-07-11 NOTE — PATIENT INSTRUCTIONS
Jayden Parker is a 69year old male here for continued care and treatment following excision of a moderately differentiated adenocarcinoma of the rectosigmoid colon in 2015. The patient denies any new complaints.   He denies any abdominal pain, nause guarding. There are no signs of ascites or peritonitis. Liver and spleen are nonpalpable. There are no palpable masses. There are no inguinal, umbilical, or incisional hernias present. All incisions have faded.      This patient remains without any juan

## 2019-07-11 NOTE — PROGRESS NOTES
Follow Up Visit Note       Active Problems      1. Malignant neoplasm of sigmoid colon ([address]    2.  Tubulovillous adenoma of rectum          Chief Complaint   Patient presents with:  Establish Care: Colon cancer continued care -colonoscopy on 6/14/19 - [facility] IIIB cancer. This is a Dukes Astler-Coller C2 lesion. He has completed his chemotherapy. The patient's port has subsequently been removed. I acted as a scribe in this encounter.      The physician obtained a history, independently performed a physi or axillary adenopathy. AORTA:  Mild atherosclerosis. No aneurysm or dissection.     VASCULATURE:  Pulmonary emboli cannot be detected without IV contrast.     ABDOMEN/PELVIS:  LIVER:  No enlargement, atrophy, abnormal density, or significant focal lesi Performed by Camilo Lockett MD at [facility] MAIN OR   • CHOLECYSTECTOMY  1/2017   • COLECTOMY  early 2015   • COLONOSCOPY  04/21/15, 5/2016    Dr. Damian Niño   • EXCISION ANAL MASS/POLYP N/A 5/6/2015    Performed by Camilo Lockett MD at [address] [place] [address] blood in stool, constipation, diarrhea, nausea and vomiting. Genitourinary: Negative for difficulty urinating, dysuria, frequency and urgency. Musculoskeletal: Negative for arthralgias and myalgias. Skin: Negative for color change and rash.    Neurolo normal pitch. There is no rebounding tenderness or guarding. There are no signs of ascites or peritonitis. Liver and spleen are nonpalpable. There are no palpable masses. There are no inguinal, umbilical, or incisional hernias present.   All incisions transanal excision of a large anal canal and rectal polyp. This was noted to have focal high-grade dysplasia. There was no invasive tumor at the complete removal of this lesion. There are good margins.   No further treatment was done at the level of the

## 2020-01-21 ENCOUNTER — OFFICE VISIT (OUTPATIENT)
Dept: SURGERY | Facility: CLINIC | Age: 75
End: 2020-01-21
Payer: MEDICARE

## 2020-01-21 VITALS
SYSTOLIC BLOOD PRESSURE: 144 MMHG | HEART RATE: 60 BPM | WEIGHT: 198 LBS | BODY MASS INDEX: 31 KG/M2 | DIASTOLIC BLOOD PRESSURE: 82 MMHG | TEMPERATURE: 98 F

## 2020-01-21 DIAGNOSIS — C18.7 MALIGNANT NEOPLASM OF SIGMOID COLON (HCC): Primary | ICD-10-CM

## 2020-01-21 DIAGNOSIS — D12.8 TUBULOVILLOUS ADENOMA OF RECTUM: ICD-10-CM

## 2020-01-21 DIAGNOSIS — Z90.5 HISTORY OF NEPHRECTOMY: ICD-10-CM

## 2020-01-21 PROCEDURE — 99213 OFFICE O/P EST LOW 20 MIN: CPT | Performed by: COLON & RECTAL SURGERY

## 2020-01-21 NOTE — PROGRESS NOTES
Follow Up Visit Note       Active Problems      1. Malignant neoplasm of sigmoid colon ([address]    2. Tubulovillous adenoma of rectum    3.  History of nephrectomy          Chief Complaint   Patient presents with:  Continuity Care: 6 month cont care for colon 17, 2018. This was completely normal with no evidence of recurrent or residual disease. He is tolerating a diet without complications. He has no nausea or vomiting. He has no fever or chills. He has no diarrhea or constipation.     He sees no bright Spouse name: Not on file      Number of children: Not on file      Years of education: Not on file      Highest education level: Not on file    Tobacco Use      Smoking status: Former Smoker        Packs/day: 0.50        Years: 35.00        Pack years: 16 Eyes: Conjunctivae are normal. No scleral icterus. Neck: Trachea normal. No JVD present. Carotid bruit is not present. No tracheal deviation present. No thyroid mass and no thyromegaly present.    Cardiovascular: Normal rate, regular rhythm, S1 normal, deep cervical adenopathy present. Right: No inguinal and no supraclavicular adenopathy present. Left: No inguinal and no supraclavicular adenopathy present. Neurological: He is alert and oriented to person, place, and time.    Skin: He is no by Dr. Noe Christopher. The patient sees Dr. Mickey Charles for oncology. His last CT scan of the chest, abdomen, and pelvis, was performed on December 17, 2018. This was completely normal with no evidence of recurrent or residual disease.     He is tolerating a diet

## 2020-01-21 NOTE — PATIENT INSTRUCTIONS
This patient has been treated in the past for multiple neoplasms of the colon and rectum. The patient was found to have synchronous lesions. He presents for further care and treatment after successful resection of both lesions.   This patient has a histor narrowing of the stool. Clinical exam reveals the left flank incision to be in line with where he points to as his point of maximal symptoms of this occasional stabbing pain.   The left ribs, left upper quadrant, and incision site show no complications,

## 2020-06-22 ENCOUNTER — LAB ENCOUNTER (OUTPATIENT)
Dept: LAB | Age: 75
End: 2020-06-22
Attending: FAMILY MEDICINE
Payer: MEDICARE

## 2020-06-22 DIAGNOSIS — I11.9 MALIGNANT HYPERTENSIVE HEART DISEASE WITHOUT HEART FAILURE: ICD-10-CM

## 2020-06-22 DIAGNOSIS — E11.9 DIABETES MELLITUS (HCC): ICD-10-CM

## 2020-06-22 DIAGNOSIS — E03.9 MYXEDEMA HEART DISEASE: Primary | ICD-10-CM

## 2020-06-22 DIAGNOSIS — I51.9 MYXEDEMA HEART DISEASE: Primary | ICD-10-CM

## 2020-06-22 PROCEDURE — 84550 ASSAY OF BLOOD/URIC ACID: CPT

## 2020-06-22 PROCEDURE — 80053 COMPREHEN METABOLIC PANEL: CPT

## 2020-06-22 PROCEDURE — 85025 COMPLETE CBC W/AUTO DIFF WBC: CPT

## 2020-06-22 PROCEDURE — 85652 RBC SED RATE AUTOMATED: CPT

## 2020-06-22 PROCEDURE — 36415 COLL VENOUS BLD VENIPUNCTURE: CPT

## 2020-07-23 ENCOUNTER — OFFICE VISIT (OUTPATIENT)
Dept: SURGERY | Facility: CLINIC | Age: 75
End: 2020-07-23
Payer: MEDICARE

## 2020-07-23 VITALS
RESPIRATION RATE: 16 BRPM | TEMPERATURE: 98 F | HEART RATE: 71 BPM | DIASTOLIC BLOOD PRESSURE: 81 MMHG | BODY MASS INDEX: 30 KG/M2 | WEIGHT: 194.63 LBS | SYSTOLIC BLOOD PRESSURE: 172 MMHG

## 2020-07-23 DIAGNOSIS — N18.2 STAGE 2 CHRONIC KIDNEY DISEASE: ICD-10-CM

## 2020-07-23 DIAGNOSIS — C18.7 MALIGNANT NEOPLASM OF SIGMOID COLON (HCC): Primary | ICD-10-CM

## 2020-07-23 DIAGNOSIS — Z90.5 HISTORY OF NEPHRECTOMY: ICD-10-CM

## 2020-07-23 DIAGNOSIS — D12.8 TUBULOVILLOUS ADENOMA OF RECTUM: ICD-10-CM

## 2020-07-23 DIAGNOSIS — T45.1X5A NEUROPATHY DUE TO CHEMOTHERAPEUTIC DRUG (HCC): ICD-10-CM

## 2020-07-23 DIAGNOSIS — G62.0 NEUROPATHY DUE TO CHEMOTHERAPEUTIC DRUG (HCC): ICD-10-CM

## 2020-07-23 PROCEDURE — 99213 OFFICE O/P EST LOW 20 MIN: CPT | Performed by: COLON & RECTAL SURGERY

## 2020-07-23 RX ORDER — ALLOPURINOL 100 MG/1
TABLET ORAL
Qty: 30 TABLET | Refills: 11 | Status: SHIPPED | OUTPATIENT
Start: 2020-07-23 | End: 2021-07-16

## 2020-07-23 NOTE — PROGRESS NOTES
Follow Up Visit Note       Active Problems      1. Malignant neoplasm of sigmoid colon ([address]    2. Tubulovillous adenoma of rectum    3. Neuropathy due to chemotherapeutic drug (HCC)    4. Stage 2 chronic kidney disease    5.  History of nephrectomy habits. He denies any constipation or diarrhea. He denies any abdominal pain, distention, nausea, vomiting, fevers, chills, or unintentional weight loss. I acted as a scribe in this encounter.      The physician obtained a history, independently perfor ROBOT-ASSISTED LAPAROSCOPIC LOW ANTERIOR COLON RESECTION N/A 5/27/2015    Performed by Shantell Dunaway MD at [address]   • [address] [facility]         The family history and social history have been reviewed by me today. History reviewed.  No pertin for behavioral problems and sleep disturbance. Physical Findings   BP (!) 172/81   Pulse 71   Temp 98 °F (36.7 °C)   Resp 16   Wt 194 lb 9.6 oz (88.3 kg)   BMI 30.48 kg/m²   Physical Exam   Constitutional: He is oriented to person, place, and time. Judgment and thought content normal.   Nursing note and vitals reviewed.        Assessment   Malignant neoplasm of sigmoid colon ([facility]  (primary encounter diagnosis)  Tubulovillous adenoma of rectum  Neuropathy due to chemotherapeutic drug ([facility]  Stage 2 ch pain, distention, nausea, vomiting, fevers, chills, or unintentional weight loss. Clinical examination reveals the abdomen to be soft, nondistended, nontender, bowel sounds are normal activity normal pitch. There is no rebound tenderness or guarding.

## 2020-07-23 NOTE — PATIENT INSTRUCTIONS
The patient presents today for continued care and evaluation for multiple neoplasms of the colon and rectum. The patient had synchronous lesions in the past, both were successfully treated and resected.   He also had a history of a left nephrectomy for a r spleen are nonpalpable. Multiple well-healed prior incisions. No palpable masses. There is a diastases recti present.     This patient demonstrates no evidence of recurrent residual cancer by clinical history, clinical exam, colonoscopy, and CT scans.

## 2021-02-01 DIAGNOSIS — Z23 NEED FOR VACCINATION: ICD-10-CM

## 2021-02-19 ENCOUNTER — APPOINTMENT (OUTPATIENT)
Dept: HEMATOLOGY/ONCOLOGY | Facility: HOSPITAL | Age: 76
End: 2021-02-19
Attending: INTERNAL MEDICINE
Payer: MEDICARE

## 2021-03-09 ENCOUNTER — OFFICE VISIT (OUTPATIENT)
Dept: HEMATOLOGY/ONCOLOGY | Facility: HOSPITAL | Age: 76
End: 2021-03-09
Attending: INTERNAL MEDICINE
Payer: MEDICARE

## 2021-03-09 VITALS
HEART RATE: 75 BPM | WEIGHT: 191 LBS | SYSTOLIC BLOOD PRESSURE: 182 MMHG | OXYGEN SATURATION: 99 % | BODY MASS INDEX: 29.98 KG/M2 | RESPIRATION RATE: 16 BRPM | HEIGHT: 67.01 IN | DIASTOLIC BLOOD PRESSURE: 80 MMHG | TEMPERATURE: 99 F

## 2021-03-09 DIAGNOSIS — M25.60 JOINT STIFFNESS: ICD-10-CM

## 2021-03-09 DIAGNOSIS — C18.9 MALIGNANT NEOPLASM OF COLON, UNSPECIFIED PART OF COLON (HCC): Primary | ICD-10-CM

## 2021-03-09 LAB
ALBUMIN SERPL-MCNC: 3.4 G/DL (ref 3.4–5)
ALBUMIN/GLOB SERPL: 0.9 {RATIO} (ref 1–2)
ALP LIVER SERPL-CCNC: 111 U/L
ALT SERPL-CCNC: 20 U/L
ANION GAP SERPL CALC-SCNC: 4 MMOL/L (ref 0–18)
AST SERPL-CCNC: 15 U/L (ref 15–37)
BASOPHILS # BLD AUTO: 0.02 X10(3) UL (ref 0–0.2)
BASOPHILS NFR BLD AUTO: 0.3 %
BILIRUB SERPL-MCNC: 0.3 MG/DL (ref 0.1–2)
BUN BLD-MCNC: 36 MG/DL (ref 7–18)
BUN/CREAT SERPL: 15 (ref 10–20)
CALCIUM BLD-MCNC: 10.3 MG/DL (ref 8.5–10.1)
CEA SERPL-MCNC: 0.4 NG/ML (ref ?–5)
CHLORIDE SERPL-SCNC: 111 MMOL/L (ref 98–112)
CO2 SERPL-SCNC: 25 MMOL/L (ref 21–32)
CREAT BLD-MCNC: 2.4 MG/DL
DEPRECATED RDW RBC AUTO: 44.2 FL (ref 35.1–46.3)
EOSINOPHIL # BLD AUTO: 0.14 X10(3) UL (ref 0–0.7)
EOSINOPHIL NFR BLD AUTO: 2.3 %
ERYTHROCYTE [DISTWIDTH] IN BLOOD BY AUTOMATED COUNT: 13.2 % (ref 11–15)
GLOBULIN PLAS-MCNC: 3.6 G/DL (ref 2.8–4.4)
GLUCOSE BLD-MCNC: 87 MG/DL (ref 70–99)
HCT VFR BLD AUTO: 40 %
HGB BLD-MCNC: 13.6 G/DL
IMM GRANULOCYTES # BLD AUTO: 0.01 X10(3) UL (ref 0–1)
IMM GRANULOCYTES NFR BLD: 0.2 %
LYMPHOCYTES # BLD AUTO: 1.15 X10(3) UL (ref 1–4)
LYMPHOCYTES NFR BLD AUTO: 19.1 %
M PROTEIN MFR SERPL ELPH: 7 G/DL (ref 6.4–8.2)
MCH RBC QN AUTO: 31.5 PG (ref 26–34)
MCHC RBC AUTO-ENTMCNC: 34 G/DL (ref 31–37)
MCV RBC AUTO: 92.6 FL
MONOCYTES # BLD AUTO: 0.57 X10(3) UL (ref 0.1–1)
MONOCYTES NFR BLD AUTO: 9.5 %
NEUTROPHILS # BLD AUTO: 4.14 X10 (3) UL (ref 1.5–7.7)
NEUTROPHILS # BLD AUTO: 4.14 X10(3) UL (ref 1.5–7.7)
NEUTROPHILS NFR BLD AUTO: 68.6 %
OSMOLALITY SERPL CALC.SUM OF ELEC: 298 MOSM/KG (ref 275–295)
PATIENT FASTING Y/N/NP: NO
PLATELET # BLD AUTO: 142 10(3)UL (ref 150–450)
POTASSIUM SERPL-SCNC: 4.4 MMOL/L (ref 3.5–5.1)
RBC # BLD AUTO: 4.32 X10(6)UL
RHEUMATOID FACT SERPL-ACNC: <10 IU/ML (ref ?–15)
SED RATE-ML: 18 MM/HR
SODIUM SERPL-SCNC: 140 MMOL/L (ref 136–145)
WBC # BLD AUTO: 6 X10(3) UL (ref 4–11)

## 2021-03-09 PROCEDURE — 99213 OFFICE O/P EST LOW 20 MIN: CPT | Performed by: INTERNAL MEDICINE

## 2021-03-09 RX ORDER — ACETAMINOPHEN 500 MG
500 TABLET ORAL AS NEEDED
COMMUNITY

## 2021-03-09 NOTE — PROGRESS NOTES
Patient is here for MD f/u for colon cancer. Patient c/o increasing arthritic joint pain over the past year. Patient reports he is not as energetic. Denies cough or SOB. No fevers. Appetite is good. Denies GI complaints.        Education Record    Learner:

## 2021-03-16 NOTE — PROGRESS NOTES
[facility]    PATIENT'S NAME: Kenya Lucas   ATTENDING PHYSICIAN: Wendy Lezama M.D.    PATIENT ACCOUNT #: [de-identified] LOCATION: [address] [place] [address] RECORD #: [id number] YOB: 1945   DATE OF SERVICE: 03/09/2021       CANCER CE drawn today. His CBC was generally normal, his platelets are 928. A sedimentation rate was drawn due to his joint stiffness and his ESR was only 18. He has chronic kidney disease with a creatinine of 2.40 at his baseline.   CEA was done and it is normal

## 2021-07-16 RX ORDER — ALLOPURINOL 100 MG/1
TABLET ORAL
Qty: 30 TABLET | Refills: 11 | Status: SHIPPED | OUTPATIENT
Start: 2021-07-16 | End: 2022-07-06

## 2021-08-05 ENCOUNTER — HOSPITAL ENCOUNTER (OUTPATIENT)
Age: 76
End: 2021-08-05
Attending: OPHTHALMOLOGY | Admitting: OPHTHALMOLOGY

## 2021-09-15 ENCOUNTER — LAB SERVICES (OUTPATIENT)
Dept: LAB | Age: 76
End: 2021-09-15

## 2021-09-15 DIAGNOSIS — Z01.812 ENCOUNTER FOR PREPROCEDURE SCREENING LABORATORY TESTING FOR COVID-19: Primary | ICD-10-CM

## 2021-09-15 DIAGNOSIS — Z11.52 ENCOUNTER FOR PREPROCEDURE SCREENING LABORATORY TESTING FOR COVID-19: Primary | ICD-10-CM

## 2021-09-15 PROCEDURE — U0005 INFEC AGEN DETEC AMPLI PROBE: HCPCS | Performed by: CLINICAL MEDICAL LABORATORY

## 2021-09-15 PROCEDURE — U0003 INFECTIOUS AGENT DETECTION BY NUCLEIC ACID (DNA OR RNA); SEVERE ACUTE RESPIRATORY SYNDROME CORONAVIRUS 2 (SARS-COV-2) (CORONAVIRUS DISEASE [COVID-19]), AMPLIFIED PROBE TECHNIQUE, MAKING USE OF HIGH THROUGHPUT TECHNOLOGIES AS DESCRIBED BY CMS-2020-01-R: HCPCS | Performed by: CLINICAL MEDICAL LABORATORY

## 2021-09-16 LAB
SARS-COV-2 RNA RESP QL NAA+PROBE: NOT DETECTED
SERVICE CMNT-IMP: NORMAL
SERVICE CMNT-IMP: NORMAL

## 2021-09-17 ENCOUNTER — ANESTHESIA EVENT (OUTPATIENT)
Dept: SURGERY | Age: 76
End: 2021-09-17

## 2021-09-17 ENCOUNTER — HOSPITAL ENCOUNTER (OUTPATIENT)
Age: 76
Discharge: HOME | End: 2021-09-17
Attending: OPHTHALMOLOGY | Admitting: OPHTHALMOLOGY

## 2021-09-17 ENCOUNTER — APPOINTMENT (OUTPATIENT)
Dept: INTERPRETER SERVICES | Age: 76
End: 2021-09-17

## 2021-09-17 ENCOUNTER — ANESTHESIA (OUTPATIENT)
Dept: SURGERY | Age: 76
End: 2021-09-17

## 2021-09-17 VITALS
OXYGEN SATURATION: 98 % | TEMPERATURE: 97 F | RESPIRATION RATE: 18 BRPM | HEIGHT: 68 IN | BODY MASS INDEX: 28.79 KG/M2 | DIASTOLIC BLOOD PRESSURE: 66 MMHG | HEART RATE: 67 BPM | WEIGHT: 190 LBS | SYSTOLIC BLOOD PRESSURE: 161 MMHG

## 2021-09-17 PROCEDURE — 10002800 HB RX 250 W HCPCS: Performed by: OPHTHALMOLOGY

## 2021-09-17 PROCEDURE — 10002801 HB RX 250 W/O HCPCS: Performed by: OPHTHALMOLOGY

## 2021-09-17 PROCEDURE — 10002803 HB RX 637: Performed by: OPHTHALMOLOGY

## 2021-09-17 PROCEDURE — 13000034 HB BASIC CASE  S/U +1ST 15 MIN: Performed by: OPHTHALMOLOGY

## 2021-09-17 PROCEDURE — V2632 POST CHMBR INTRAOCULAR LENS: HCPCS | Performed by: OPHTHALMOLOGY

## 2021-09-17 PROCEDURE — 13000035 HB BASIC CASE EA ADD MINUTE: Performed by: OPHTHALMOLOGY

## 2021-09-17 PROCEDURE — 10002807 HB RX 258: Performed by: OPHTHALMOLOGY

## 2021-09-17 PROCEDURE — 10006023 HB SUPPLY 272: Performed by: OPHTHALMOLOGY

## 2021-09-17 PROCEDURE — 13000007 HB ANESTHESIA MAC EA ADD MINUTE: Performed by: OPHTHALMOLOGY

## 2021-09-17 PROCEDURE — 13000006 HB ANESTHESIA MAC S/U + 1ST 15 MIN: Performed by: OPHTHALMOLOGY

## 2021-09-17 PROCEDURE — 10002803 HB RX 637

## 2021-09-17 PROCEDURE — 10002801 HB RX 250 W/O HCPCS

## 2021-09-17 PROCEDURE — 10006026 HB SUPPLY 276: Performed by: OPHTHALMOLOGY

## 2021-09-17 PROCEDURE — 13000001 HB PHASE II RECOVERY EA 30 MINUTES: Performed by: OPHTHALMOLOGY

## 2021-09-17 PROCEDURE — 10002800 HB RX 250 W HCPCS

## 2021-09-17 DEVICE — ACRYSOF(R) IQ ASPHERIC NATURAL IOL, SINGLE-PIECE ACRYLIC FOLDABLE PCL, UV WITH BLUE LIGHTFILTER, 13.0MM LENGTH, 6.0MM ANTERIORASYMMETRIC BICONVEX OPTIC, PLANAR HAPTICS.
Type: IMPLANTABLE DEVICE | Site: EYE | Status: FUNCTIONAL
Brand: ACRYSOF®

## 2021-09-17 RX ORDER — PHENYLEPHRINE HCL - LIDOCAINE HCL 10; 15 MG/ML; MG/ML
INJECTION, SOLUTION INTRAOCULAR; OPHTHALMIC PRN
Status: DISCONTINUED | OUTPATIENT
Start: 2021-09-17 | End: 2021-09-17 | Stop reason: HOSPADM

## 2021-09-17 RX ORDER — METOCLOPRAMIDE HYDROCHLORIDE 5 MG/ML
5 INJECTION INTRAMUSCULAR; INTRAVENOUS EVERY 6 HOURS PRN
Status: DISCONTINUED | OUTPATIENT
Start: 2021-09-17 | End: 2021-09-17 | Stop reason: HOSPADM

## 2021-09-17 RX ORDER — METHYLPREDNISOLONE SODIUM SUCCINATE 40 MG/ML
INJECTION, POWDER, LYOPHILIZED, FOR SOLUTION INTRAMUSCULAR; INTRAVENOUS PRN
Status: DISCONTINUED | OUTPATIENT
Start: 2021-09-17 | End: 2021-09-17 | Stop reason: HOSPADM

## 2021-09-17 RX ORDER — SODIUM CHLORIDE, SODIUM LACTATE, POTASSIUM CHLORIDE, CALCIUM CHLORIDE 600; 310; 30; 20 MG/100ML; MG/100ML; MG/100ML; MG/100ML
10 INJECTION, SOLUTION INTRAVENOUS CONTINUOUS
Status: DISCONTINUED | OUTPATIENT
Start: 2021-09-17 | End: 2021-09-17 | Stop reason: HOSPADM

## 2021-09-17 RX ORDER — TROPICAMIDE 10 MG/ML
1 SOLUTION/ DROPS OPHTHALMIC
Status: COMPLETED | OUTPATIENT
Start: 2021-09-17 | End: 2021-09-17

## 2021-09-17 RX ORDER — EPHEDRINE SULFATE/0.9% NACL/PF 50 MG/10ML
10 SYRINGE (ML) INTRAVENOUS
Status: DISCONTINUED | OUTPATIENT
Start: 2021-09-17 | End: 2021-09-17 | Stop reason: HOSPADM

## 2021-09-17 RX ORDER — TOBRAMYCIN 40 MG/ML
INJECTION INTRAMUSCULAR; INTRAVENOUS PRN
Status: DISCONTINUED | OUTPATIENT
Start: 2021-09-17 | End: 2021-09-17 | Stop reason: HOSPADM

## 2021-09-17 RX ORDER — ONDANSETRON 2 MG/ML
4 INJECTION INTRAMUSCULAR; INTRAVENOUS 2 TIMES DAILY PRN
Status: DISCONTINUED | OUTPATIENT
Start: 2021-09-17 | End: 2021-09-17 | Stop reason: HOSPADM

## 2021-09-17 RX ORDER — TETRACAINE HYDROCHLORIDE 5 MG/ML
SOLUTION OPHTHALMIC PRN
Status: DISCONTINUED | OUTPATIENT
Start: 2021-09-17 | End: 2021-09-17 | Stop reason: HOSPADM

## 2021-09-17 RX ORDER — MIDAZOLAM HYDROCHLORIDE 1 MG/ML
INJECTION, SOLUTION INTRAMUSCULAR; INTRAVENOUS PRN
Status: DISCONTINUED | OUTPATIENT
Start: 2021-09-17 | End: 2021-09-17

## 2021-09-17 RX ORDER — DEXAMETHASONE SODIUM PHOSPHATE 4 MG/ML
4 INJECTION, SOLUTION INTRA-ARTICULAR; INTRALESIONAL; INTRAMUSCULAR; INTRAVENOUS; SOFT TISSUE
Status: DISCONTINUED | OUTPATIENT
Start: 2021-09-17 | End: 2021-09-17 | Stop reason: HOSPADM

## 2021-09-17 RX ORDER — PHENYLEPHRINE HYDROCHLORIDE 25 MG/ML
1 SOLUTION/ DROPS OPHTHALMIC
Status: COMPLETED | OUTPATIENT
Start: 2021-09-17 | End: 2021-09-17

## 2021-09-17 RX ADMIN — SODIUM CHLORIDE, POTASSIUM CHLORIDE, SODIUM LACTATE AND CALCIUM CHLORIDE 10 ML/HR: 600; 310; 30; 20 INJECTION, SOLUTION INTRAVENOUS at 07:58

## 2021-09-17 RX ADMIN — PHENYLEPHRINE HYDROCHLORIDE 1 DROP: 25 SOLUTION/ DROPS OPHTHALMIC at 08:09

## 2021-09-17 RX ADMIN — FENTANYL CITRATE 50 MCG: 50 INJECTION, SOLUTION INTRAMUSCULAR; INTRAVENOUS at 08:50

## 2021-09-17 RX ADMIN — MIDAZOLAM HYDROCHLORIDE 1 MG: 1 INJECTION, SOLUTION INTRAMUSCULAR; INTRAVENOUS at 08:46

## 2021-09-17 RX ADMIN — TROPICAMIDE 1 DROP: 10 SOLUTION/ DROPS OPHTHALMIC at 08:09

## 2021-09-17 RX ADMIN — PHENYLEPHRINE HYDROCHLORIDE 1 DROP: 25 SOLUTION/ DROPS OPHTHALMIC at 07:52

## 2021-09-17 RX ADMIN — PHENYLEPHRINE HYDROCHLORIDE 1 DROP: 25 SOLUTION/ DROPS OPHTHALMIC at 07:57

## 2021-09-17 RX ADMIN — MIDAZOLAM HYDROCHLORIDE 1 MG: 1 INJECTION, SOLUTION INTRAMUSCULAR; INTRAVENOUS at 08:50

## 2021-09-17 RX ADMIN — TROPICAMIDE 1 DROP: 10 SOLUTION/ DROPS OPHTHALMIC at 07:57

## 2021-09-17 RX ADMIN — PHENYLEPHRINE HYDROCHLORIDE 1 DROP: 25 SOLUTION/ DROPS OPHTHALMIC at 08:03

## 2021-09-17 RX ADMIN — TROPICAMIDE 1 DROP: 10 SOLUTION/ DROPS OPHTHALMIC at 07:53

## 2021-09-17 RX ADMIN — FENTANYL CITRATE 50 MCG: 50 INJECTION, SOLUTION INTRAMUSCULAR; INTRAVENOUS at 08:46

## 2021-09-17 RX ADMIN — TROPICAMIDE 1 DROP: 10 SOLUTION/ DROPS OPHTHALMIC at 08:03

## 2021-09-17 ASSESSMENT — ACTIVITIES OF DAILY LIVING (ADL)
ADL_SCORE: 12
RECENT_DECLINE_ADL: NO
ADL_BEFORE_ADMISSION: INDEPENDENT
ADL_SHORT_OF_BREATH: NO
HISTORY OF FALLING IN THE LAST YEAR (PRIOR TO ADMISSION): NO
NEEDS_ASSIST: NO

## 2021-09-17 ASSESSMENT — PAIN SCALES - GENERAL
PAINLEVEL_OUTOF10: 0
PAINLEVEL_OUTOF10: 0

## 2021-09-29 ENCOUNTER — APPOINTMENT (OUTPATIENT)
Dept: INTERPRETER SERVICES | Age: 76
End: 2021-09-29

## 2022-07-06 RX ORDER — ALLOPURINOL 100 MG/1
TABLET ORAL
Qty: 30 TABLET | Refills: 0 | Status: SHIPPED | OUTPATIENT
Start: 2022-07-06

## 2022-07-08 ENCOUNTER — OFFICE VISIT (OUTPATIENT)
Dept: RHEUMATOLOGY | Facility: CLINIC | Age: 77
End: 2022-07-08
Payer: MEDICARE

## 2022-07-08 VITALS
TEMPERATURE: 98 F | HEART RATE: 71 BPM | WEIGHT: 172 LBS | HEIGHT: 69 IN | DIASTOLIC BLOOD PRESSURE: 80 MMHG | SYSTOLIC BLOOD PRESSURE: 145 MMHG | OXYGEN SATURATION: 99 % | BODY MASS INDEX: 25.48 KG/M2

## 2022-07-08 DIAGNOSIS — M10.09 IDIOPATHIC GOUT OF MULTIPLE SITES, UNSPECIFIED CHRONICITY: ICD-10-CM

## 2022-07-08 DIAGNOSIS — M11.20 CALCIUM PYROPHOSPHATE DEPOSITION DISEASE (CPPD): ICD-10-CM

## 2022-07-08 DIAGNOSIS — M47.816 OSTEOARTHRITIS OF LUMBAR SPINE, UNSPECIFIED SPINAL OSTEOARTHRITIS COMPLICATION STATUS: ICD-10-CM

## 2022-07-08 DIAGNOSIS — M25.561 ARTHRALGIA OF BOTH KNEES: ICD-10-CM

## 2022-07-08 DIAGNOSIS — N18.4 CKD (CHRONIC KIDNEY DISEASE) STAGE 4, GFR 15-29 ML/MIN (HCC): ICD-10-CM

## 2022-07-08 DIAGNOSIS — M25.541 ARTHRALGIA OF BOTH HANDS: ICD-10-CM

## 2022-07-08 DIAGNOSIS — N18.4 STAGE 4 CHRONIC KIDNEY DISEASE (HCC): ICD-10-CM

## 2022-07-08 DIAGNOSIS — M25.50 POLYARTHRALGIA: Primary | ICD-10-CM

## 2022-07-08 DIAGNOSIS — M25.542 ARTHRALGIA OF BOTH HANDS: ICD-10-CM

## 2022-07-08 DIAGNOSIS — M25.562 ARTHRALGIA OF BOTH KNEES: ICD-10-CM

## 2022-07-08 DIAGNOSIS — M12.9 ARTHROPATHY, UNSPECIFIED: ICD-10-CM

## 2022-07-08 DIAGNOSIS — Z51.81 THERAPEUTIC DRUG MONITORING: ICD-10-CM

## 2022-07-08 PROCEDURE — 99204 OFFICE O/P NEW MOD 45 MIN: CPT | Performed by: INTERNAL MEDICINE

## 2022-07-09 PROBLEM — M25.50 POLYARTHRALGIA: Status: ACTIVE | Noted: 2022-07-09

## 2022-07-14 ENCOUNTER — HOSPITAL ENCOUNTER (OUTPATIENT)
Dept: GENERAL RADIOLOGY | Age: 77
Discharge: HOME | End: 2022-07-14
Attending: INTERNAL MEDICINE
Payer: MEDICARE

## 2022-07-14 ENCOUNTER — LAB ENCOUNTER (OUTPATIENT)
Dept: LAB | Age: 77
End: 2022-07-14
Attending: INTERNAL MEDICINE
Payer: MEDICARE

## 2022-07-14 DIAGNOSIS — M25.561 ARTHRALGIA OF BOTH KNEES: ICD-10-CM

## 2022-07-14 DIAGNOSIS — M25.541 ARTHRALGIA OF BOTH HANDS: ICD-10-CM

## 2022-07-14 DIAGNOSIS — M25.542 ARTHRALGIA OF BOTH HANDS: ICD-10-CM

## 2022-07-14 DIAGNOSIS — M25.562 ARTHRALGIA OF BOTH KNEES: ICD-10-CM

## 2022-07-14 LAB
ALBUMIN SERPL-MCNC: 3.5 G/DL (ref 3.4–5)
ALBUMIN/GLOB SERPL: 1 {RATIO} (ref 1–2)
ALP LIVER SERPL-CCNC: 109 U/L
ALT SERPL-CCNC: 12 U/L
ANION GAP SERPL CALC-SCNC: 6 MMOL/L (ref 0–18)
AST SERPL-CCNC: 9 U/L (ref 15–37)
BASOPHILS # BLD AUTO: 0.02 X10(3) UL (ref 0–0.2)
BASOPHILS NFR BLD AUTO: 0.4 %
BILIRUB SERPL-MCNC: 0.4 MG/DL (ref 0.1–2)
BILIRUB UR QL: NEGATIVE
BUN BLD-MCNC: 41 MG/DL (ref 7–18)
BUN/CREAT SERPL: 16 (ref 10–20)
CALCIUM BLD-MCNC: 10.5 MG/DL (ref 8.5–10.1)
CHLORIDE SERPL-SCNC: 112 MMOL/L (ref 98–112)
CLARITY UR: CLEAR
CO2 SERPL-SCNC: 23 MMOL/L (ref 21–32)
COLOR UR: YELLOW
CREAT BLD-MCNC: 2.56 MG/DL
CREAT UR-SCNC: 65.9 MG/DL
CRP SERPL-MCNC: 1.51 MG/DL (ref ?–0.3)
DEPRECATED HBV CORE AB SER IA-ACNC: 130.6 NG/ML
DEPRECATED RDW RBC AUTO: 47.1 FL (ref 35.1–46.3)
EOSINOPHIL # BLD AUTO: 0.08 X10(3) UL (ref 0–0.7)
EOSINOPHIL NFR BLD AUTO: 1.6 %
ERYTHROCYTE [DISTWIDTH] IN BLOOD BY AUTOMATED COUNT: 13.3 % (ref 11–15)
ERYTHROCYTE [SEDIMENTATION RATE] IN BLOOD: 24 MM/HR
FASTING STATUS PATIENT QL REPORTED: YES
GLOBULIN PLAS-MCNC: 3.5 G/DL (ref 2.8–4.4)
GLUCOSE BLD-MCNC: 87 MG/DL (ref 70–99)
GLUCOSE UR-MCNC: NEGATIVE MG/DL
HCT VFR BLD AUTO: 39.3 %
HGB BLD-MCNC: 12.5 G/DL
IMM GRANULOCYTES # BLD AUTO: 0.01 X10(3) UL (ref 0–1)
IMM GRANULOCYTES NFR BLD: 0.2 %
KETONES UR-MCNC: NEGATIVE MG/DL
LEUKOCYTE ESTERASE UR QL STRIP.AUTO: NEGATIVE
LYMPHOCYTES # BLD AUTO: 1.06 X10(3) UL (ref 1–4)
LYMPHOCYTES NFR BLD AUTO: 20.9 %
MAGNESIUM SERPL-MCNC: 2.5 MG/DL (ref 1.6–2.6)
MCH RBC QN AUTO: 30.5 PG (ref 26–34)
MCHC RBC AUTO-ENTMCNC: 31.8 G/DL (ref 31–37)
MCV RBC AUTO: 95.9 FL
MONOCYTES # BLD AUTO: 0.53 X10(3) UL (ref 0.1–1)
MONOCYTES NFR BLD AUTO: 10.5 %
NEUTROPHILS # BLD AUTO: 3.36 X10 (3) UL (ref 1.5–7.7)
NEUTROPHILS # BLD AUTO: 3.36 X10(3) UL (ref 1.5–7.7)
NEUTROPHILS NFR BLD AUTO: 66.4 %
NITRITE UR QL STRIP.AUTO: NEGATIVE
OSMOLALITY SERPL CALC.SUM OF ELEC: 301 MOSM/KG (ref 275–295)
PH UR: 5.5 [PH] (ref 5–8)
PHOSPHATE SERPL-MCNC: 2.8 MG/DL (ref 2.5–4.9)
PLATELET # BLD AUTO: 163 10(3)UL (ref 150–450)
POTASSIUM SERPL-SCNC: 4.5 MMOL/L (ref 3.5–5.1)
PROT SERPL-MCNC: 7 G/DL (ref 6.4–8.2)
PROT UR-MCNC: 92 MG/DL
PTH-INTACT SERPL-MCNC: 190.3 PG/ML (ref 18.5–88)
RBC # BLD AUTO: 4.1 X10(6)UL
SODIUM SERPL-SCNC: 141 MMOL/L (ref 136–145)
SP GR UR STRIP: 1.01 (ref 1–1.03)
URATE SERPL-MCNC: 6.8 MG/DL
UROBILINOGEN UR STRIP-ACNC: 0.2
WBC # BLD AUTO: 5.1 X10(3) UL (ref 4–11)

## 2022-07-14 PROCEDURE — 84550 ASSAY OF BLOOD/URIC ACID: CPT | Performed by: INTERNAL MEDICINE

## 2022-07-14 PROCEDURE — 73562 X-RAY EXAM OF KNEE 3: CPT | Performed by: INTERNAL MEDICINE

## 2022-07-14 PROCEDURE — 81015 MICROSCOPIC EXAM OF URINE: CPT | Performed by: INTERNAL MEDICINE

## 2022-07-14 PROCEDURE — 85025 COMPLETE CBC W/AUTO DIFF WBC: CPT | Performed by: INTERNAL MEDICINE

## 2022-07-14 PROCEDURE — 80053 COMPREHEN METABOLIC PANEL: CPT | Performed by: INTERNAL MEDICINE

## 2022-07-14 PROCEDURE — 83735 ASSAY OF MAGNESIUM: CPT | Performed by: INTERNAL MEDICINE

## 2022-07-14 PROCEDURE — 82306 VITAMIN D 25 HYDROXY: CPT | Performed by: INTERNAL MEDICINE

## 2022-07-14 PROCEDURE — 73130 X-RAY EXAM OF HAND: CPT | Performed by: INTERNAL MEDICINE

## 2022-07-14 PROCEDURE — 86200 CCP ANTIBODY: CPT | Performed by: INTERNAL MEDICINE

## 2022-07-14 PROCEDURE — 81001 URINALYSIS AUTO W/SCOPE: CPT | Performed by: INTERNAL MEDICINE

## 2022-07-14 PROCEDURE — 36415 COLL VENOUS BLD VENIPUNCTURE: CPT | Performed by: INTERNAL MEDICINE

## 2022-07-14 PROCEDURE — 84100 ASSAY OF PHOSPHORUS: CPT | Performed by: INTERNAL MEDICINE

## 2022-07-14 PROCEDURE — 86140 C-REACTIVE PROTEIN: CPT | Performed by: INTERNAL MEDICINE

## 2022-07-14 PROCEDURE — 82728 ASSAY OF FERRITIN: CPT | Performed by: INTERNAL MEDICINE

## 2022-07-14 PROCEDURE — 82570 ASSAY OF URINE CREATININE: CPT | Performed by: INTERNAL MEDICINE

## 2022-07-14 PROCEDURE — 85652 RBC SED RATE AUTOMATED: CPT | Performed by: INTERNAL MEDICINE

## 2022-07-14 PROCEDURE — 84156 ASSAY OF PROTEIN URINE: CPT | Performed by: INTERNAL MEDICINE

## 2022-07-14 PROCEDURE — 83970 ASSAY OF PARATHORMONE: CPT | Performed by: INTERNAL MEDICINE

## 2022-07-15 LAB — CCP IGG SERPL-ACNC: 3.6 U/ML (ref 0–6.9)

## 2022-07-17 ENCOUNTER — TELEPHONE (OUTPATIENT)
Dept: RHEUMATOLOGY | Facility: CLINIC | Age: 77
End: 2022-07-17

## 2022-07-17 DIAGNOSIS — R79.89 HIGH SERUM PARATHYROID HORMONE (PTH): Primary | ICD-10-CM

## 2022-07-17 DIAGNOSIS — E83.52 HYPERCALCEMIA: ICD-10-CM

## 2022-07-17 LAB — VIT D+METAB SERPL-MCNC: 27.7 NG/ML (ref 30–100)

## 2022-07-20 LAB — ANA SER QL: NEGATIVE

## 2022-07-21 ENCOUNTER — TELEPHONE (OUTPATIENT)
Dept: RHEUMATOLOGY | Facility: CLINIC | Age: 77
End: 2022-07-21

## 2022-07-21 DIAGNOSIS — M11.20 CALCIUM PYROPHOSPHATE DEPOSITION DISEASE (CPPD): Primary | ICD-10-CM

## 2022-07-21 DIAGNOSIS — N18.4 CKD (CHRONIC KIDNEY DISEASE) STAGE 4, GFR 15-29 ML/MIN (HCC): ICD-10-CM

## 2022-07-21 DIAGNOSIS — M10.09 IDIOPATHIC GOUT OF MULTIPLE SITES, UNSPECIFIED CHRONICITY: ICD-10-CM

## 2022-07-21 DIAGNOSIS — E21.3 HYPERPARATHYROIDISM (HCC): ICD-10-CM

## 2022-07-21 RX ORDER — PREDNISONE 1 MG/1
TABLET ORAL
Qty: 28 TABLET | Refills: 0 | Status: SHIPPED | OUTPATIENT
Start: 2022-07-21 | End: 2022-08-11

## 2022-07-21 RX ORDER — ALLOPURINOL 100 MG/1
TABLET ORAL
Qty: 165 TABLET | Refills: 1 | Status: SHIPPED | OUTPATIENT
Start: 2022-07-21 | End: 2022-10-19

## 2022-08-23 ENCOUNTER — OFFICE VISIT (OUTPATIENT)
Dept: RHEUMATOLOGY | Facility: CLINIC | Age: 77
End: 2022-08-23
Payer: MEDICARE

## 2022-08-23 VITALS
BODY MASS INDEX: 26.22 KG/M2 | SYSTOLIC BLOOD PRESSURE: 120 MMHG | HEART RATE: 66 BPM | DIASTOLIC BLOOD PRESSURE: 78 MMHG | WEIGHT: 173 LBS | TEMPERATURE: 99 F | OXYGEN SATURATION: 99 % | HEIGHT: 68 IN

## 2022-08-23 DIAGNOSIS — N18.4 STAGE 4 CHRONIC KIDNEY DISEASE (HCC): ICD-10-CM

## 2022-08-23 DIAGNOSIS — Z51.81 THERAPEUTIC DRUG MONITORING: ICD-10-CM

## 2022-08-23 DIAGNOSIS — M10.09 IDIOPATHIC GOUT OF MULTIPLE SITES, UNSPECIFIED CHRONICITY: ICD-10-CM

## 2022-08-23 DIAGNOSIS — M11.20 CALCIUM PYROPHOSPHATE DEPOSITION DISEASE (CPPD): Primary | ICD-10-CM

## 2022-08-23 PROCEDURE — 99214 OFFICE O/P EST MOD 30 MIN: CPT | Performed by: INTERNAL MEDICINE

## 2022-08-23 RX ORDER — PREDNISONE 1 MG/1
TABLET ORAL
Qty: 140 TABLET | Refills: 0 | Status: SHIPPED | OUTPATIENT
Start: 2022-08-23 | End: 2022-10-18

## 2022-08-23 RX ORDER — HYDROXYCHLOROQUINE SULFATE 200 MG/1
200 TABLET, FILM COATED ORAL 2 TIMES DAILY
Qty: 180 TABLET | Refills: 1 | Status: SHIPPED | OUTPATIENT
Start: 2022-08-23

## 2022-08-23 NOTE — PATIENT INSTRUCTIONS
-Continue allopurinol 200 mg daily. We will repeat uric acid in about 2 months when you see kidney doctor. - predniSONE 1 MG Oral Tab; Take 4 tablets (4 mg total) by mouth daily with breakfast for 14 days, THEN 3 tablets (3 mg total) daily with breakfast for 14 days, THEN 2 tablets (2 mg total) daily with breakfast for 14 days, THEN 1 tablet (1 mg total) daily with breakfast for 14 days. Dispense: 140 tablet; Refill: 0  -Hydroxychloroquine (plaquenil) start: Always take WITH food. Take 1 tab daily for 2 weeks, and if tolerating, then increase to 1 tab twice daily.

## 2023-07-10 DIAGNOSIS — M10.09 IDIOPATHIC GOUT OF MULTIPLE SITES, UNSPECIFIED CHRONICITY: Primary | ICD-10-CM

## 2023-07-10 RX ORDER — ALLOPURINOL 100 MG/1
200 TABLET ORAL DAILY
Qty: 180 TABLET | Refills: 1 | Status: SHIPPED | OUTPATIENT
Start: 2023-07-10

## 2023-07-10 NOTE — TELEPHONE ENCOUNTER
Future Appointments   Date Time Provider Bessy Maxwell   8/28/2023  1:30 PM Jacqui Villa MD [facility]     LOV  8/23/2022    Last refill  7/6/2022  30 tabs, 0 refills

## 2023-08-22 ENCOUNTER — LAB ENCOUNTER (OUTPATIENT)
Dept: LAB | Age: 78
End: 2023-08-22
Attending: INTERNAL MEDICINE
Payer: MEDICARE

## 2023-08-22 LAB
ALBUMIN SERPL-MCNC: 3.6 G/DL (ref 3.4–5)
ALBUMIN/GLOB SERPL: 1 {RATIO} (ref 1–2)
ALP LIVER SERPL-CCNC: 94 U/L
ALT SERPL-CCNC: 15 U/L
ANION GAP SERPL CALC-SCNC: 1 MMOL/L (ref 0–18)
AST SERPL-CCNC: 11 U/L (ref 15–37)
BILIRUB SERPL-MCNC: 0.4 MG/DL (ref 0.1–2)
BUN BLD-MCNC: 32 MG/DL (ref 7–18)
BUN/CREAT SERPL: 11.6 (ref 10–20)
CALCIUM BLD-MCNC: 10.7 MG/DL (ref 8.5–10.1)
CHLORIDE SERPL-SCNC: 114 MMOL/L (ref 98–112)
CO2 SERPL-SCNC: 27 MMOL/L (ref 21–32)
CREAT BLD-MCNC: 2.75 MG/DL
EGFRCR SERPLBLD CKD-EPI 2021: 23 ML/MIN/1.73M2 (ref 60–?)
FASTING STATUS PATIENT QL REPORTED: YES
GLOBULIN PLAS-MCNC: 3.5 G/DL (ref 2.8–4.4)
GLUCOSE BLD-MCNC: 86 MG/DL (ref 70–99)
OSMOLALITY SERPL CALC.SUM OF ELEC: 300 MOSM/KG (ref 275–295)
POTASSIUM SERPL-SCNC: 5.2 MMOL/L (ref 3.5–5.1)
PROT SERPL-MCNC: 7.1 G/DL (ref 6.4–8.2)
SODIUM SERPL-SCNC: 142 MMOL/L (ref 136–145)
URATE SERPL-MCNC: 6.2 MG/DL

## 2023-08-22 PROCEDURE — 84550 ASSAY OF BLOOD/URIC ACID: CPT | Performed by: INTERNAL MEDICINE

## 2023-08-22 PROCEDURE — 80053 COMPREHEN METABOLIC PANEL: CPT | Performed by: INTERNAL MEDICINE

## 2023-08-22 PROCEDURE — 36415 COLL VENOUS BLD VENIPUNCTURE: CPT | Performed by: INTERNAL MEDICINE

## 2023-08-23 ENCOUNTER — OFFICE VISIT (OUTPATIENT)
Dept: NEPHROLOGY | Facility: CLINIC | Age: 78
End: 2023-08-23
Payer: MEDICARE

## 2023-08-23 VITALS — SYSTOLIC BLOOD PRESSURE: 168 MMHG | WEIGHT: 179.38 LBS | DIASTOLIC BLOOD PRESSURE: 78 MMHG | BODY MASS INDEX: 27 KG/M2

## 2023-08-23 DIAGNOSIS — E83.52 HYPERCALCEMIA: ICD-10-CM

## 2023-08-23 DIAGNOSIS — N18.4 CKD (CHRONIC KIDNEY DISEASE) STAGE 4, GFR 15-29 ML/MIN (HCC): Primary | ICD-10-CM

## 2023-08-23 PROCEDURE — 99204 OFFICE O/P NEW MOD 45 MIN: CPT | Performed by: INTERNAL MEDICINE

## 2023-11-09 ENCOUNTER — HOSPITAL ENCOUNTER (EMERGENCY)
Facility: HOSPITAL | Age: 78
Discharge: HOME | End: 2023-11-09
Attending: EMERGENCY MEDICINE
Payer: MEDICARE

## 2023-11-09 ENCOUNTER — APPOINTMENT (OUTPATIENT)
Dept: MRI IMAGING | Facility: HOSPITAL | Age: 78
End: 2023-11-09
Attending: EMERGENCY MEDICINE
Payer: MEDICARE

## 2023-11-09 VITALS
OXYGEN SATURATION: 92 % | BODY MASS INDEX: 27.47 KG/M2 | DIASTOLIC BLOOD PRESSURE: 74 MMHG | SYSTOLIC BLOOD PRESSURE: 158 MMHG | RESPIRATION RATE: 17 BRPM | TEMPERATURE: 98 F | HEART RATE: 68 BPM | HEIGHT: 67 IN | WEIGHT: 175 LBS

## 2023-11-09 DIAGNOSIS — R42 DIZZINESS: Primary | ICD-10-CM

## 2023-11-09 LAB
ALBUMIN SERPL-MCNC: 3.2 G/DL (ref 3.4–5)
ALBUMIN/GLOB SERPL: 0.9 {RATIO} (ref 1–2)
ALP LIVER SERPL-CCNC: 100 U/L
ALT SERPL-CCNC: 12 U/L
ANION GAP SERPL CALC-SCNC: 9 MMOL/L (ref 0–18)
AST SERPL-CCNC: 9 U/L (ref 15–37)
BASOPHILS # BLD AUTO: 0.01 X10(3) UL (ref 0–0.2)
BASOPHILS NFR BLD AUTO: 0.2 %
BILIRUB SERPL-MCNC: 0.4 MG/DL (ref 0.1–2)
BUN BLD-MCNC: 37 MG/DL (ref 9–23)
CALCIUM BLD-MCNC: 10.6 MG/DL (ref 8.5–10.1)
CHLORIDE SERPL-SCNC: 114 MMOL/L (ref 98–112)
CO2 SERPL-SCNC: 18 MMOL/L (ref 21–32)
CREAT BLD-MCNC: 2.8 MG/DL
EGFRCR SERPLBLD CKD-EPI 2021: 23 ML/MIN/1.73M2 (ref 60–?)
EOSINOPHIL # BLD AUTO: 0.02 X10(3) UL (ref 0–0.7)
EOSINOPHIL NFR BLD AUTO: 0.4 %
ERYTHROCYTE [DISTWIDTH] IN BLOOD BY AUTOMATED COUNT: 13.2 %
GLOBULIN PLAS-MCNC: 3.5 G/DL (ref 2.8–4.4)
GLUCOSE BLD-MCNC: 164 MG/DL (ref 70–99)
HCT VFR BLD AUTO: 36.8 %
HGB BLD-MCNC: 12.3 G/DL
IMM GRANULOCYTES # BLD AUTO: 0.01 X10(3) UL (ref 0–1)
IMM GRANULOCYTES NFR BLD: 0.2 %
LYMPHOCYTES # BLD AUTO: 0.48 X10(3) UL (ref 1–4)
LYMPHOCYTES NFR BLD AUTO: 8.8 %
MCH RBC QN AUTO: 30.9 PG (ref 26–34)
MCHC RBC AUTO-ENTMCNC: 33.4 G/DL (ref 31–37)
MCV RBC AUTO: 92.5 FL
MONOCYTES # BLD AUTO: 0.23 X10(3) UL (ref 0.1–1)
MONOCYTES NFR BLD AUTO: 4.2 %
NEUTROPHILS # BLD AUTO: 4.73 X10 (3) UL (ref 1.5–7.7)
NEUTROPHILS # BLD AUTO: 4.73 X10(3) UL (ref 1.5–7.7)
NEUTROPHILS NFR BLD AUTO: 86.2 %
OSMOLALITY SERPL CALC.SUM OF ELEC: 304 MOSM/KG (ref 275–295)
PLATELET # BLD AUTO: 162 10(3)UL (ref 150–450)
POTASSIUM SERPL-SCNC: 4.7 MMOL/L (ref 3.5–5.1)
PROT SERPL-MCNC: 6.7 G/DL (ref 6.4–8.2)
RBC # BLD AUTO: 3.98 X10(6)UL
SODIUM SERPL-SCNC: 141 MMOL/L (ref 136–145)
WBC # BLD AUTO: 5.5 X10(3) UL (ref 4–11)

## 2023-11-09 PROCEDURE — 93010 ELECTROCARDIOGRAM REPORT: CPT

## 2023-11-09 PROCEDURE — 85025 COMPLETE CBC W/AUTO DIFF WBC: CPT | Performed by: EMERGENCY MEDICINE

## 2023-11-09 PROCEDURE — 99285 EMERGENCY DEPT VISIT HI MDM: CPT

## 2023-11-09 PROCEDURE — 70551 MRI BRAIN STEM W/O DYE: CPT | Performed by: EMERGENCY MEDICINE

## 2023-11-09 PROCEDURE — 93005 ELECTROCARDIOGRAM TRACING: CPT

## 2023-11-09 PROCEDURE — 99284 EMERGENCY DEPT VISIT MOD MDM: CPT

## 2023-11-09 PROCEDURE — 80053 COMPREHEN METABOLIC PANEL: CPT | Performed by: EMERGENCY MEDICINE

## 2023-11-09 PROCEDURE — 96374 THER/PROPH/DIAG INJ IV PUSH: CPT

## 2023-11-09 RX ORDER — ONDANSETRON 2 MG/ML
4 INJECTION INTRAMUSCULAR; INTRAVENOUS ONCE
Status: COMPLETED | OUTPATIENT
Start: 2023-11-09 | End: 2023-11-09

## 2023-11-09 RX ORDER — ONDANSETRON 4 MG/1
4 TABLET, ORALLY DISINTEGRATING ORAL EVERY 4 HOURS PRN
Qty: 10 TABLET | Refills: 0 | Status: SHIPPED | OUTPATIENT
Start: 2023-11-09 | End: 2023-11-16

## 2023-11-09 NOTE — ED INITIAL ASSESSMENT (HPI)
Patient to ED with daughter, who is helping translate.    + head spinning and nausea. Reports this started after eating this morning at around 730.   Complaints intermittently for the past month, today is much worse
eyeglasses

## 2023-11-10 ENCOUNTER — TELEPHONE (OUTPATIENT)
Dept: NEUROLOGY | Facility: CLINIC | Age: 78
End: 2023-11-10

## 2023-11-10 LAB
ATRIAL RATE: 100 BPM
P AXIS: 74 DEGREES
P-R INTERVAL: 208 MS
Q-T INTERVAL: 362 MS
QRS DURATION: 92 MS
QTC CALCULATION (BEZET): 466 MS
R AXIS: 49 DEGREES
T AXIS: 54 DEGREES
VENTRICULAR RATE: 100 BPM

## 2023-11-10 NOTE — TELEPHONE ENCOUNTER
TIA CLINIC SCREENING    1. Date of ED visit/TIA diagnosis:  11/09/2023   Time of discharge from ED:  1910    2. Is patient currently admitted? No   If YES - [facility] Clinic Appointment not required. 3. Does patient already see an [facility] neurologist?  No  Name:     (if YES - [facility] Appointment not required. Route message on to patient's neurologist)    4. Patient's current anti-platelet therapy:  NONE    5. Patient's current statin therapy:  none    6. Has 2D Echo with bubble test been done? No  Date:      7. Is [facility]IA Clinic Appointment indicated? Unsure     If YES - route encounter to [address] [place] [address] to schedule patient for appointment NO LATER THAN 48 HOURS AFTER ED DISCHARGE. If UNSURE - route encounter to clinic provider for recommendation. If NO - indicate reason and close encounter:  N/A    Will route to Dr. Yulisa Urbano to assess for appointment.

## 2023-11-10 NOTE — DISCHARGE INSTRUCTIONS
Follow-up with neurology clinic, call tomorrow for an appointment  Follow-up with ENT as scheduled  Follow-up with primary care next week  Return anytime if you feel worse  Zofran for nausea  Change positions slowly  Do not climb stairs, ladders. Stay on level ground.   Continue regular medications

## 2023-11-10 NOTE — ED QUICK NOTES
This tech performed orthostatics, which were negative. Tech road tested pt and walked with a steady gait, denied complaints of dizziness while ambulating. Tech took pt to the restroom and back to room. RN and MD notified.

## 2023-11-10 NOTE — TELEPHONE ENCOUNTER
Parvez Whitaker MD   to Me  [facility] Nurse       11/10/23  9:05 AM   Peripheral vertigo. Has an appt with ENT and Regular appt with us is fine.

## 2023-11-17 ENCOUNTER — OFFICE VISIT (OUTPATIENT)
Facility: LOCATION | Age: 78
End: 2023-11-17
Payer: MEDICARE

## 2023-11-17 DIAGNOSIS — E21.3 HYPERPARATHYROIDISM (HCC): Primary | ICD-10-CM

## 2023-11-17 DIAGNOSIS — R42 VERTIGO: ICD-10-CM

## 2023-11-17 DIAGNOSIS — H93.11 TINNITUS OF RIGHT EAR: Primary | ICD-10-CM

## 2023-11-17 DIAGNOSIS — H93.293 ABNORMAL AUDITORY PERCEPTION OF BOTH EARS: ICD-10-CM

## 2023-11-17 PROCEDURE — 92557 COMPREHENSIVE HEARING TEST: CPT | Performed by: AUDIOLOGIST

## 2023-11-17 PROCEDURE — 99204 OFFICE O/P NEW MOD 45 MIN: CPT | Performed by: OTOLARYNGOLOGY

## 2023-11-17 PROCEDURE — 92567 TYMPANOMETRY: CPT | Performed by: AUDIOLOGIST

## 2023-11-17 NOTE — PROGRESS NOTES
Nerissa Trujillo was seen for an audiometric evaluation and tympanogram today. Referred back to physician. Hearing aid evaluation recommended.     Nerissa Cobos MS, CCC-A

## 2023-11-17 NOTE — PROGRESS NOTES
Iesha Diallo is a 69year old male. Chief Complaint   Patient presents with    Thyroid Problem     Elevated parathyroid levels     HPI:   He has 2 complaints. He was found to have hyperparathyroidism. He does have renal disease but is not on dialysis. He has 1 kidney. He denies fractures or kidney stones. He is also had a 6-week history of muffling in the right ear. He presented the emergency room with dizziness on the ninth. It was true vertigo. His dizzy symptoms have since improved. He had an MRI which was otherwise negative. Current Outpatient Medications   Medication Sig Dispense Refill    allopurinol 100 MG Oral Tab Take 2 tablets (200 mg total) by mouth daily. 180 tablet 1    hydroxychloroquine 200 MG Oral Tab Take 1 tablet (200 mg total) by mouth 2 (two) times daily. 180 tablet 0    acetaminophen 500 MG Oral Tab Take 1 tablet (500 mg total) by mouth as needed for Pain. aspirin 81 MG Oral Tab Take 1 tablet (81 mg total) by mouth daily.         Past Medical History:   Diagnosis Date    Cancer ([facility]     colon    High blood pressure     NO MEDS    High cholesterol     NO MEDS    Personal history of antineoplastic chemotherapy     2015 last dose    Visual impairment     GLASSES    Wears glasses       Social History:  Social History     Socioeconomic History    Marital status:    Tobacco Use    Smoking status: Former     Packs/day: 0.50     Years: 35.00     Additional pack years: 0.00     Total pack years: 17.50     Types: Cigarettes     Quit date: 1995     Years since quittin.9    Smokeless tobacco: Never   Substance and Sexual Activity    Alcohol use: Yes     Comment: 1-2 a weekj    Drug use: No      Past Surgical History:   Procedure Laterality Date    APPENDECTOMY      in [facility]  2017    COLECTOMY  early 2015    COLONOSCOPY  04/21/15, 2016    Dr. Mary Kay Cheung N/A 2017    Procedure: LAPAROSCOPIC CHOLECYSTECTOMY;  Surgeon: Leila Concepcion MD;  Location: [facility] MAIN OR    OTHER SURGICAL HISTORY  2004    left kidney removed    SIGMOIDOSCOPY,DIAGNOSTIC           REVIEW OF SYSTEMS:   GENERAL HEALTH: feels well otherwise  GENERAL : denies fever, chills, sweats, weight loss, weight gain  SKIN: denies any unusual skin lesions or rashes  RESPIRATORY: denies shortness of breath with exertion  NEURO: denies headaches    EXAM:   There were no vitals taken for this visit. System Findings Details   Constitutional  Overall appearance - Normal.   Psychiatric  Orientation - Oriented to time, place, person & situation. Appropriate mood and affect. Head/Face  Facial features -- Normal. Skull - Normal.   Eyes  Pupils equal ,round ,react to light and accomidate   Ears, Nose, Throat, Neck  Ears clear nose clear pharynx clear neck supple without masses   Neurological  Memory - Normal. Cranial nerves - Cranial nerves II through XII grossly intact. Lymph Detail  Submental. Submandibular. Anterior cervical. Posterior cervical. Supraclavicular. Latest Audiogram Result (Hz) Exam performed: 11/17/2023 10:50 AM Last edited by Kevon Bolanos MS, CCC-A on 11/17/2023 11:11 AM        125 250  1500 2000 3000 4000 6000 8000    Right air:  25 25 30 30 45 65 85 85  85    Left air:  10 10 20 25 55 60 75 85  95    Right mastoid bone:     20    75      Left mastoid bone:   10  20  60  75      Right mastoid bone (masked):   25               Reliability:  Good    Transducer:  Headphones    Technique:  Conventional Audiometry    Comments:            Latest Speech Audiometry  Last edited by Kevon Bolanos MS, CCC-A on 11/17/2023 11:07 AM       Ear Method PTA SAT SRT [facility] Test/list Score (%) Intensity Mask/noise Notes    left    25    68 70      right    30    72 70       Comments: English is not primary language.   Selected spondees                  Latest Tympanogram Result       Probe Tone (Hz): Unknown Exam performed: 11/17/2023 10:54 AM Last edited by Job Galindo MS, EMANUEL on 11/17/2023 11:11 AM      Tympanograms  These were drawn by a user, not generated from device data      Right Ear Left Ear                     Right Ear Left Ear    Tympanogram type:      Canal volume (mL): 1.18 1.8    Peak pressure (daPa):      Peak amplitude (mL):      Tympanogram width (daPa): Comments:                    Latest Audiogram and Tympanogram Result Text  Last edited by Job Galindo, MS, EMANUEL on 11/17/2023 11:11 AM      Study Result                 Narrative & Impression  Patient complains of hearing loss and tinnitus R>L, history of occupational noise exposure, and recent vertigo episode one week ago. Audiogram:  Left - Mild sloping to profound SNHL 1-8kHz with poor WR ability in quiet. Right - Mild sloping to profound SNHL with fail WR ability in quiet. Tympanogram:  WNL bilaterally                   ASSESSMENT AND PLAN:   1. Hyperparathyroidism ([address]  Parathyroid hormone level elevated at 190. Calcium also elevated at 10.7. He will undergo further work-up including DEXA scan and parathyroid nuclear medicine scan. We will then speak after the above. - XR DEXA BONE DENSITOMETRY (CPT=77080); Future  - NM PARATHYROID SPECT/CT (FTU=49026); Future    2. Vertigo  Audiogram reveals high-frequency hearing loss bilaterally. His symptoms suggest benign positional vertigo. His symptoms have improved. If they return I would consider vestibular physical therapy. The patient indicates understanding of these issues and agrees to the plan. No follow-ups on file.     Bora Sagastume MD  11/17/2023  11:16 AM

## 2024-06-17 DIAGNOSIS — M10.09 IDIOPATHIC GOUT OF MULTIPLE SITES, UNSPECIFIED CHRONICITY: ICD-10-CM

## 2024-06-17 RX ORDER — ALLOPURINOL 100 MG/1
200 TABLET ORAL DAILY
Qty: 180 TABLET | Refills: 1 | OUTPATIENT
Start: 2024-06-17

## 2024-06-17 NOTE — TELEPHONE ENCOUNTER
LOV:   08/23/22    No future appointments.    LF:  07/10/2023    QTY:  180    Refills:   1    LABS:   Uric Acid  Order: [id number]   Collected 8/22/2023 10:32 AM       Status: Final result       Dx: Calcium pyrophosphate deposition dise...    1 Result Note       1 Patient Communication      Component  Ref Range & Units 8/22/23 10:32 AM   Uric Acid  3.5 - 7.2 mg/dL 6.2   Resulting Agency [facility] ([facility])              Specimen Collected: 08/22/23 10:32 AM Last Resulted: 08/22/23  3:52 PM

## 2025-03-18 NOTE — PROGRESS NOTES
Left Voicemail (1st Attempt) for the patient to call back and schedule the following:    Appointment type: NEW DIABETES  Provider: Casandra Hull PA-C  Return date: Nadege 3/28/25  Specialty phone number: [phone]  Additional appointment(s) needed: N/A  Additonal Notes:  LVM, MyCx1, Due to changes in the providers schedule appt on 3/28 needs to get rescheduled.  Options are listed below in hold slots or first available with Kurtis.  AVILA Ok'd by Betzaida.    Examples:  5/13  Bree  Virtual (CSC) (Hold Slot)  6/3  Demetrius  Virtual  (CSC) (Hold Slot)    Please note that the above appointment(s) will require manual scheduling as they are marked as AVIAL and will not appear using auto search. Do not schedule the patient if another patient has already been scheduled in the requested appointment slot.       David Gallagher on 3/18/2025 at 10:21 AM     Post Operative Visit Note       Active Problems  1. Acute on chronic cholecystitis    2.  Calculus of gallbladder without cholecystitis without obstruction         Chief Complaint   Post-Op     History of Present Illness   This patient is doing very well fo Marital Status:             Spouse Name:                       Years of Education:                 Number of children:               Social History Main Topics    Smoking Status: Former Smoker                   Packs/Day: 0.50  Years: 28        Chilo Abdominal: Soft. Bowel sounds are normal. He exhibits no distension, no fluid wave, no ascites and no mass. There is no tenderness. There is no rigidity, no rebound and no guarding. No hernia.        Abdominal exam: Soft, nontender, nondistended, good bow Up  Return if symptoms worsen or fail to improve.     Natasha Bailey, [address]

## (undated) DEVICE — Device

## (undated) DEVICE — LAWSON - SOL IRR STL H20 1000ML POUR BT

## (undated) DEVICE — LAWSON - GOWN SURG STD XL STL DISP

## (undated) NOTE — MR AVS SNAPSHOT
[facility]  [address] [address] [phone]               Thank you for choosing us for your health care visit with Donna Andrade MD.  We are glad to serve you and happy to provide you with this summary of you nondistended, nontender, negative Santiago sign. There is no ascites. Liver is within normal limits, spleen is not palpable. There are no inguinal, umbilical, or incisional hernias present.   He has no incisions in the upper abdomen other than trocar sites Take 81 mg by mouth daily.                 Where to Get Your Medications      These medications were sent to [address], [facility] [place], [phone], [address],

## (undated) NOTE — LETTER
20    Patient: Julisa Rothman  : 1/10/1945 Visit date: 2020    Dear  Dr. Checo Samuels MD,    Thank you for referring Julisa Rothman to my practice. Please find my assessment and plan below.         Assessment   Malignant neoplasm of sigmoid c abdomen, and pelvis, was performed on December 17, 2018. This was completely normal with no evidence of recurrent or residual disease. He is tolerating a diet without complications. He has no nausea or vomiting. He has no fever or chills.   He has no

## (undated) NOTE — MR AVS SNAPSHOT
After Visit Summary   6/23/2017    Blanca Nelson    MRN: [id number]           Diagnoses this Visit     Malignant neoplasm of colon, unspecified part of colon ([facility]    -  Primary       Allergies     No Known Allergies      Your Vital Signs Were

## (undated) NOTE — MR AVS SNAPSHOT
[facility]  [address], [address] [address] [phone]               Thank you for choosing us for your health care visit with Marj Larry MD.  We are glad to serve you and happy to provide you with this summary of you other than transanal excision. There's been no radiation treatment. On May 27, 2015, the patient underwent laparoscopic robotic low anterior resection the rectosigmoid colon for tumor at the rectosigmoid junction at the level of the sacral promontory. This list is accurate as of: 3/27/17 11:59 PM.  Always use your most recent med list.                allopurinol 100 MG Tabs   Take 1 tablet (100 mg total) by mouth daily.    Commonly known as:  ZYLOPRIM           aspirin 81 MG Tabs   Take 81 mg by mouth da

## (undated) NOTE — Clinical Note
2017    Patient: Christi Maher  : 1/10/1945 Visit date: 2017    Dear  Dr. Yves Hutton MD,    Thank you for referring Christi Maher to my practice. Please find my assessment and plan below.         Assessment   Acute on chronic cholecystitis This patient will undergo laparoscopic cholecystectomy with cholangiogram.    I have asked him to not take anything fried, fatty, greasy, or dairy products. He should report to us urgently for any further attacks prior to his scheduled operation.     He

## (undated) NOTE — Clinical Note
2017    Patient: Yessi Paul  : 1/10/1945 Visit date: 3/27/2017    Dear  Dr. Sundeep Hancock MD,    Thank you for referring Yessi Paul to my practice. Please find my assessment and plan below.         Assessment   Malignant neoplasm of sigmoid are not palpable. Patient's BMI is 27.55. He has no abdominal wall mass lesions. He has no intra-abdominal masses palpable. The patient demonstrates no evidence of recurrent residual polyps or tumor.               Sincerely,       Robi Martines MD   C

## (undated) NOTE — LETTER
17    Patient: Blanca Nelson  : 1/10/1945 Visit date: 2017    Dear  Dr. Jae Ch MD,    Thank you for referring Blanca Nelson to my practice. Please find my assessment and plan below.                Assessment   Malignant neoplasm of si flank incision for left nephrectomy. There are no masses in the abdominal wall or abdominal cavity. Bowel sounds have normal activity normal pitch. Liver and spleen are not palpable. The patient has no evidence of ascites.   He has no inguinal, [place]

## (undated) NOTE — LETTER
18    Patient: Jack Huff  : 1/10/1945 Visit date: 2018    Dear  Dr. Cleave Dakin, MD,    Thank you for referring Jack Daria to my practice. Please find my assessment and plan below.                Assessment   Malignant neoplasm of si He has a Pfannenstiel incision. He has no palpable masses within the abdominal wall or abdominal cavity. There are no inguinal, umbilical, or incisional hernias present. Bowel sounds have normal activity and normal pitch.     This patient remains stable

## (undated) NOTE — LETTER
17    Patient: Tacho Elaine  : 1/10/1945 Visit date: 2017    Dear  Dr. Marcel Garcia MD,    Thank you for referring Tacho Elaine to my practice. Please find my assessment and plan below.            Assessment   Malignant neoplasm of sigmoi Clinical exam reveals his abdomen to be soft, nondistended, nontender, good bowel sounds. He has essentially invisible incisions from his abdominal operation. There are no inguinal, umbilical, or incisional hernias present.   There is no evidence of ascit

## (undated) NOTE — LETTER
19    Patient: Freya Arzola  : 1/10/1945 Visit date: 2019    Dear  Dr. Daisy Coello MD,    Thank you for referring Freya Arzola to my practice. Please find my assessment and plan below.          Assessment   Malignant neoplasm of sigmoid adenocarcinoma. He had T3 N1 M0 stage IIIB cancer. This is a Dukes Astler-Coller C2 lesion. He has completed his chemotherapy. The patient's port has subsequently been removed.     Clinical exam today reveals his abdomen to be soft, nondistended, non

## (undated) NOTE — LETTER
01/10/19    Patient: Yessi Paul  : 1/10/1945 Visit date: 1/10/2019    Dear  Dr. Sundeep Hancock MD,    Thank you for referring Yessi Paul to my practice. Please find my assessment and plan below.                Assessment   Malignant neoplasm of si recurrent, residual, or metastatic disease. Clinical exam today reveals his abdomen to be soft, nondistended, nontender, good bowel sounds. He has no guarding or rebound. There are no masses. There is no evidence of ascites.   Liver and spleen are not

## (undated) NOTE — LETTER
20    Patient: Adi Tay  : 1/10/1945 Visit date: 2020    Dear  Dr. Zahraa Balderrama MD,    Thank you for referring Adi Tay to my practice. Please find my assessment and plan below.         Assessment   Malignant neoplasm of sigmoid c mucus, dark tarry stools. He denies any narrowing in his stools or change in bowel habits. He denies any constipation or diarrhea. He denies any abdominal pain, distention, nausea, vomiting, fevers, chills, or unintentional weight loss.     Clinical exam